# Patient Record
Sex: MALE | Race: WHITE | NOT HISPANIC OR LATINO | Employment: UNEMPLOYED | URBAN - METROPOLITAN AREA
[De-identification: names, ages, dates, MRNs, and addresses within clinical notes are randomized per-mention and may not be internally consistent; named-entity substitution may affect disease eponyms.]

---

## 2022-04-01 ENCOUNTER — MEDICAL CORRESPONDENCE (OUTPATIENT)
Dept: HEALTH INFORMATION MANAGEMENT | Facility: CLINIC | Age: 62
End: 2022-04-01
Payer: MEDICAID

## 2022-04-08 ENCOUNTER — TRANSCRIBE ORDERS (OUTPATIENT)
Dept: OTHER | Age: 62
End: 2022-04-08

## 2022-04-08 DIAGNOSIS — C44.329 SQUAMOUS CELL CANCER OF SKIN OF RIGHT CHEEK: Primary | ICD-10-CM

## 2022-04-11 NOTE — TELEPHONE ENCOUNTER
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

## 2022-04-25 ENCOUNTER — OFFICE VISIT (OUTPATIENT)
Dept: DERMATOLOGY | Facility: CLINIC | Age: 62
End: 2022-04-25
Payer: MEDICAID

## 2022-04-25 DIAGNOSIS — C44.320 SCC (SQUAMOUS CELL CARCINOMA), FACE: Primary | ICD-10-CM

## 2022-04-25 PROCEDURE — 99203 OFFICE O/P NEW LOW 30 MIN: CPT | Mod: GC | Performed by: DERMATOLOGY

## 2022-04-25 ASSESSMENT — PAIN SCALES - GENERAL: PAINLEVEL: SEVERE PAIN (7)

## 2022-04-25 NOTE — LETTER
Date:April 27, 2022      Provider requested that no letter be sent. Do not send.       [facility]

## 2022-04-25 NOTE — PROGRESS NOTES
Mohs Micrographic Surgery Consult Note    Apr 25, 2022    Dermatology Problem List:  1. Melanoma in situ, left back, s/p bx 4/1/22, s/p excision  2. NMSC  - SCC, right sideburn, s/p bx 4/1/22, pending MMS  - SCC, right lateral cheek, s/p bx 4/1/22, pending MMS  - BCC, right upper chest, s/p bx 4/1/22  - BCC, left upper chest, s/p bx 4/1/22  3. HAK, left medial chest, s/p bx 4/1/22    Subjective: The patient is a 61 year old man who presents today for Mohs micrographic surgery consultation for a recent diagnosis of skin cancer.    Skin cancer(s): SCC  Location(s): right sideburn and right lateral cheek  Associated symptoms: pruritus, tenderness to touch  Onset: within last 1 year    No other associated symptoms, modifying factors, or prior treatments, except when noted above. The patient denies any constitutional symptoms, lymphadenopathy, unintentional weight loss or decreased appetite. No other skin concerns today.    Objective:   Gen: This is a well appearing individual in no acute distress. The patient is alert and oriented x 3.  An exam of the right cheek was performed today and visualized the following:  - At the right lateral superior cheek, roughly 1.5 cm pink plaque with crust.  - At the right lateral inferior cheek, roughly 2 cm nodular plaque.    Assessment and Plan:     1. Plan for Mohs micrographic surgery for skin cancer(s) above:  - We discussed the nature of the diagnosis/condition above. We discussed the treatment options, including the risks benefits and expectations of these options. We recommend micrographic surgery as the most effective and most tissue sparing option for treatment, and the patient agrees to proceed with this.  The patient is aware of the risks, benefits and expectations of this procedure. The patient will be scheduled for this procedure, if not already done so.  - We anticipate the following closure type: Linear closure, such as complex linear closure (CLC)   - Consent form was  signed today    The patient was discussed with and evaluated by attending physician, Jean Carlos Peralta MD.    Edgardo Leong MD  Micrographic Surgery and Dermatologic Oncology (MSDO) Fellow    Scribe Disclosure:  I, Mikhail Jacques, am serving as a scribe to document services personally performed by Jean Carlos Peralta MD based on data collection and the provider's statements to me.     Attending Attestation  I attest that I discussed the case with the Fellow.  I agree with the plan.   I have reviewed the note and edited it as necessary.    Jean Carlos Peralta M.D.  Professor  Director of Dermatologic Surgery  Department of Dermatology  [facility]

## 2022-04-25 NOTE — LETTER
4/25/2022       RE: Cornelius Ruiz  [id number]  [place] [address]     Dear Colleague,    Thank you for referring your patient, Cornelius Ruiz, to the [facility] [place] at [facility]. Please see a copy of my visit note below.    Mohs Micrographic Surgery Consult Note    Apr 25, 2022    Dermatology Problem List:  1. Melanoma in situ, left back, s/p bx 4/1/22, s/p excision  2. NMSC  - SCC, right sideburn, s/p bx 4/1/22, pending MMS  - SCC, right lateral cheek, s/p bx 4/1/22, pending MMS  - BCC, right upper chest, s/p bx 4/1/22  - BCC, left upper chest, s/p bx 4/1/22  3. HAK, left medial chest, s/p bx 4/1/22    Subjective: The patient is a 61 year old man who presents today for Mohs micrographic surgery consultation for a recent diagnosis of skin cancer.    Skin cancer(s): SCC  Location(s): right sideburn and right lateral cheek  Associated symptoms: pruritus, tenderness to touch  Onset: within last 1 year    No other associated symptoms, modifying factors, or prior treatments, except when noted above. The patient denies any constitutional symptoms, lymphadenopathy, unintentional weight loss or decreased appetite. No other skin concerns today.    Objective:   Gen: This is a well appearing individual in no acute distress. The patient is alert and oriented x 3.  An exam of the right cheek was performed today and visualized the following:  - At the right lateral superior cheek, roughly 1.5 cm pink plaque with crust.  - At the right lateral inferior cheek, roughly 2 cm nodular plaque.    Assessment and Plan:     1. Plan for Mohs micrographic surgery for skin cancer(s) above:  - We discussed the nature of the diagnosis/condition above. We discussed the treatment options, including the risks benefits and expectations of these options. We recommend micrographic surgery as the most effective and most tissue sparing option for  treatment, and the patient agrees to proceed with this.  The patient is aware of the risks, benefits and expectations of this procedure. The patient will be scheduled for this procedure, if not already done so.  - We anticipate the following closure type: Linear closure, such as complex linear closure (CLC)   - Consent form was signed today    The patient was discussed with and evaluated by attending physician, Jean Carlos Peralta MD.    Edgardo Leong MD  Micrographic Surgery and Dermatologic Oncology (MSDO) Fellow    Scribe Disclosure:  I, Mikhail Jacques, am serving as a scribe to document services personally performed by Jean Carlos Peralta MD based on data collection and the provider's statements to me.     Attending Attestation  I attest that I discussed the case with the Fellow.  I agree with the plan.   I have reviewed the note and edited it as necessary.    Jean Carlos Peralta M.D.  Professor  Director of Dermatologic Surgery  Department of Dermatology  [facility]        Again, thank you for allowing me to participate in the care of your patient.      Sincerely,    Jean Carlos Peralta MD

## 2022-04-25 NOTE — PATIENT INSTRUCTIONS
Mohs Micrographic Surgery     Mohs Surgery Quick Tips     Please reserve the half day that you are with us and do not schedule any other appointments the morning of your surgery date. We cannot guarantee what time the surgery will be done as it will depend on how many times the Mohs surgeon has to go back and remove more tissue to completely remove your skin cancer.  If you suspect you will experience excess anxiety or claustrophobia during the procedure, please let the Mohs surgeon know prior to surgery to allow us time to address this. If an anti-anxiety medication is required, then you will need a designated  to drive you home.  Mohs surgery is done under local anesthesia, so you should eat breakfast and take your regularly scheduled medications. You do NOT need to fast.  Wear comfortable, loose-fitting clothing that can easily be taken off and put back on before and after surgery.  Most of your time with us will be spent waiting for tissue to be processed and carefully looked at under the microscope by the Mohs surgeon. Bring with you a book, tablet, or smartphone that you can use to spend the waiting time. You are allowed to eat lunch.  You should have a designated  if surgery will involve an area that can occlude vision. This is because you can get swelling/bruising immediately after surgery and will go home with a bulky bandage that could obstruct your view of driving safely.  In most cases, you will go home with a bulky pressure dressing over the site that will need to remain on, clean, and dry for the first 48 hours. You will not be able to get the site wet for the first 48 hours. This bulky pressure dressing is to help prevent post-op bleeding. Your nurse will provide detailed wound care instructions verbally and in writing on the day of surgery.  The overwhelming majority of patients manage their normal post-op pain with Tylenol alternating with ibuprofen.   Any time the skin is cut  surgically, including with Mohs surgery, a scar forms. Scars are unavoidable but are minimized with the Mohs surgery approach. A scar is thought to be better than a skin cancer that could become a serious risk to your future health. The goal with Mohs surgery is for the scar to be barely noticeable by an acquaintance talking with you at a normal conversational distance (say, 4-5 feet away) by 3 months after your surgery. In the meantime, please be patient as it takes about 3 months for scars to mature in appearance and begin to fade.  Do NOT wear make-up on the day of surgery if the skin cancer is on your face.  Do NOT use Neosporin on your wound. It is not effective at preventing infections and can lead to irritation similar to an allergic reaction at the wound site. Do not use harsh soaps like Dial soap or [place] on your wound as this will also lead to irritation.     What is  Mohs micrographic surgery ?     Mohs micrographic surgery is considered the most effective treatment for many skin cancers. It is named after the late Dr. Frederic Mohs, who pioneered this technique. Note that  Mohs  surgery is not an abbreviation or acronym, but is named after Dr. Mohs.  Overview of Mohs Micrographic Surgery  Skin cancer often resembles the  tip of the iceberg  with more tumor cells growing downward and outward into the skin like the roots of a tree. These  roots  are not visible with the naked eye, but instead can be seen under a microscope. With the Mohs technique, the dermatologic surgeon can precisely identify and remove an entire tumor while leaving the surrounding healthy tissue intact and unharmed. Mohs surgery has the highest success rate of all treatments for many skin cancers - up to 99%.  Mohs Surgeons  Fellowship-trained Mohs surgeons are specially trained as a cancer surgeon, pathologist, and reconstructive surgeon all in one.  Advantages of Mohs Surgery  Mohs surgery is unique and so effective because of  the way the removed tissue is microscopically examined, evaluating 100% of the surgical margins. The pathologic interpretation of the tissue margins is done on site by the Mohs surgeon, who is specially trained in the reading of these slides.     Advantages of Mohs surgery include:  - Ensuring complete cancer removal during surgery to provide the best cure rate and protect against cancer recurrence  - Minimizing the amount of healthy tissue lost  - Maximizing the functional and cosmetic outcome resulting from surgery  - Repairing the site of the cancer the same day the cancer is removed (in most cases)  - Curing skin cancer when other methods have failed  Other skin cancer treatment methods require the provider to often blindly estimate the amount of tissue to treat and remove, which can result in the unnecessary removal of healthy skin tissue, as well as the skin cancer coming back if any part of it is left behind.     Your Mohs Procedure  Mohs skin cancer surgery is an outpatient procedure, which takes place in our on-site surgical suite. Our suite is equipped with a dedicated Mohs laboratory for microscopic examination of tissue. Surgeries start early in the morning and are completed the same day, depending on the extent of the tumor and the amount of reconstruction necessary.  First, you will receive local anesthesia (i.e., injections with lidocaine anesthetic) around the area of the tumor, so you are awake during the entire procedure. The use of local anesthesia versus general anesthesia provides many benefits, including preventing a lengthy recovery, possible side effects from general anesthesia, and cost. You are completely numb in the area of the surgery, however, so the procedure is comfortable.      After the area has been numbed, all visible tumor, along with a thin layer of surrounding tissue, is taken out with a scalpel blade. After this, patients are bandaged by our nursing staff and can rest in the  reception area, cafe on the first floor, or can remain in the patient room while awaiting testing results. A specially trained technician prepares the tissue and puts it on slides for your Mohs surgeon to examine under a microscope. Waiting time can range from 60-90 minutes while the tissue is being processed. If cancer involving the edges of the removed tissue is seen by the Mohs surgeon under the microscope, then another layer of tissue is removed from the area where the cancer was detected using a scalpel blade. This way only cancerous tissue is targeted during the procedure, minimizing the loss of healthy tissue. These steps are repeated until no cancer is remaining. Most skin cancers require 1 to 3 rounds for complete removal (all performed on the same day). Aggressive cancers can take several rounds of surgery to cure. Sometimes skin cancer can appear to be small to the naked eye but in fact is larger in reality. This means that the wound left behind after skin cancer removal could be larger than what you may expect, but remember that this may be necessary to remove all the skin cancer present. You do not want any skin cancer to be left behind as that will lead to the skin cancer coming back within months to years and may require a larger surgery.  After Your Skin Cancer is Removed  When your surgery is complete, your Mohs surgeon, who has expertise in reconstructive surgery, assesses the wound and discusses your options for the best functional and cosmetic outcome.      Closing the wound:  Depending on the size of the wound and what your Mohs surgeon decides, your wound will be closed using one of the following options:  Letting the wound heal on its own from the edges and without stitches  Using stitches to close the wound in a line  Using stitches to close the wound by shifting skin from a nearby area of skin (called a  skin flap )  Using stitches to place a skin graft from another part of the body on the  wound     Every wound is specially managed to maximize the functional and cosmetic outcome. For reconstruction on the head, we take great care to make sure stitches, if needed, do not interfere with the resting positions of the eyelids, nose, mouth, and ears, so we sometimes have to use facial plastic surgery techniques to close wounds. Patients are sometimes surprised by how many stitches are used, but this is because we want to do a good job at carefully lining up the wound edges to minimize scarring and prevent the wound from opening up later on, which could lead to an infection. We try to use stitches that self-dissolve whenever possible. In most cases, you will go home with a bulky pressure dressing over the surgical site that will need to remain on, clean, and dry for the first 48 hours. You will not be able to get the site wet for the first 48 hours. This bulky pressure dressing is to help prevent post-op bleeding. Your nurse will give you detailed wound care instructions verbally and in writing on the day of your surgery.     Post-op pain  Tylenol and ibuprofen are sufficient for pain control for the overwhelming majority of patients. If you have been told by another physician not to take Tylenol (also called acetaminophen) or ibuprofen (also called Motrin or Advil), then let your Mohs surgeon know. We typically suggest taking Tylenol 1,000 mg (i.e., two extra strength tabs) every 8 hours. In between those doses of Tylenol, you can take ibuprofen 400 mg (two tabs) every eight hours. This essentially means that you would take either Tylenol or ibuprofen alternating every four hours. Do NOT take more than 4,000 mg of Tylenol or 3,200 mg of ibuprofen per 24 hour period. Icing for 15 minutes every hour will help with pain and swelling as well, and it is especially helpful for surgeries around the eyes. Keeping the wound area elevated will also help with swelling and pain. If your wound is on your lower leg, then  wearing compression stockings can reduce swelling and speed healing.     Expectations About Surgical Scars  Any time the skin is cut, the body forms a scar. This is normal and natural, and a scar is thought to be better than a skin cancer that could become a serious risk to your health. The goal with Mohs surgery is for the scar to be barely noticeable by an acquaintance talking with you at a normal conversational distance (say, 4-5 feet away) by 3 months after your surgery. In the meantime, please be patient as it takes about 3 months for scars to mature in appearance and begin to fade. It is important to wear sunscreen over a scar starting about 1 month after surgery as scars do not tan normally and can become discolored, compared with the surrounding skin, after sun exposure. Additionally, gentle scar massage can often be started about 1 month after surgery. This means gentle, kneading massage on the scar for a couple minutes several times a day. After three months of waiting to ensure all inflammation has resolved and the scar has matured, we are happy to see you if you have persistent concerns with scarring. On rare occasions, we perform steroid injections or use a laser to treat a scar. These types of treatments are not  one-and-done  and multiple sessions are usually necessary to help scar appearance.     Wound care instructions:    Leave the initial pressure dressing on for 48 hours. A pressure dressing is placed to provide consistent pressure that will decrease the chance of bleeding.    After the first 48 hours you should remove the dressing and follow the instructions below:   1) Clean the incisions/surrounding area with a gentle cleanser and warm water. This can be done in or out of the shower, but it is important to note that if you do it in the shower it should be the last thing you clean.    2) Pat the area dry with a clean towel.   3) Apply Vaseline over the sutured area.  Use a new container or the  sterile packets given to you, do not use Vaseline that is in your cupboard as this is likely contaminated. The Vaseline will keep the sutures moist, help dissolvable sutures dissolve, and prevent scabbing from occurring which can cause scarring. Do not apply anything other than Vaseline (no bacitracin, hydrogen peroxide, etc.) for the first 2 weeks as this may cause the sutures to dissolve sooner than appropriate.   4) Cover the area with a non-stick gauze and tape or another bandage of your choice.    5) Make sure that you practice good hand hygiene prior to removing the bandage and use a q tip to apply Vaseline to the area.    6) Repeat the steps above daily for the first 2 weeks after surgery.          Risks:    Scar formation at the site of tumor removal. You may need further treatment with steroid injections/lasers for scarring.    Larger than expected wound created upon removal of the skin cancer.   Poor wound healing, which may be due to the patient's underlying health conditions or failure of the wound repair method.   Excessive bleeding from the wound, which could affect wound healing and/or result in the need for more office visits.    Wound that becomes infected (an uncommon occurrence, minimized by using sterile technique).   Loss of nerve function (muscle or feeling) if a tumor invades a nerve, which can be temporary or permanent.   Regrowth of tumor after removal (more common with previously treated tumors and large, longstanding tumors).   Cosmetic or functional deformities if tumor is near or on an important structure  ture such as eyes, eyelids, nose, ears, lips, forehead, scalp, fingers, or genital area.      If you have any questions, please feel free to contact us.    Phone numbers:    During business hours (M-F 8:00-4:30 p.m.)    [facility]   [phone]

## 2022-04-25 NOTE — NURSING NOTE
Dermatology Rooming Note    Cornelius Ruiz's goals for this visit include:   Chief Complaint   Patient presents with     Squamous Cell Carcinoma     Cornelius is here for a consult regarding squamous cell cancer of skin on right cheek x2. Has had MOHS procedure in the past, but it grew back.     Tonia Castro, Facilitator

## 2022-04-25 NOTE — PROGRESS NOTES
[facility] Mohs Surgery Procedure Note    Case #: 1  Date of Service:  Apr 26, 2022  Surgery: Mohs micrographic surgery (MMS)  Staff surgeon: Jean Carlos Peralta MD  Fellow surgeon: Edgardo Leong MD  Resident surgeon: None  Nurse: Lisa Johnson CMA    Tumor Type: SCC  Location: right lateral cheek  Derm-Path Accession #: [id number]     Mohs Accession #:   Pre-Op Size: 1.9 cm x 2.0 cm  Defect Size: 2.9 x 3.0 cm  Number of Mohs stages: 1  Level of Defect: Fascia  Local anesthetic: 3 mL 1% lidocaine with epinephrine  Repair Type: Burow's advancement with other flap, combined closure MMS site  Repair Size: 7.8 x 5.0 cm  Suture Material: 4-0 Monocryl; 5-0 fast absorbing gut    Procedure:    Stage I  We discussed the principles of treatment and most likely complications including scarring, bleeding, infection, swelling, pain, crusting, nerve damage, large wound,  incomplete excision, wound dehiscence,  nerve damage, recurrence, and a second procedure may be recommended to obtain the best cosmetic or functional result.    Informed consent was obtained and the patient underwent the procedure as follows:  The patient was placed supine on the operating table.  The cancer was identified, outlined with a marker, and verified by the patient.  The entire surgical field was prepped with chlorhexidine.  The surgical site was anesthetized using lidocaine with epinephrine.    The area of clinically apparent tumor was debulked. The layer of tissue was then surgically excised using a #15 blade and was then transferred onto a specimen sheet maintaining the orientation of the specimen. Hemostasis was obtained using electrocoagulation. The wound site was then covered with a dressing while the tissue samples were processed for examination.    The excised tissue was transported to the Mohs histology laboratory maintaining the tissue orientation.  The tissue specimen was relaxed so that the entire surgical margin was in a a  single horizontal plane for sectioning and inked for precise mapping.  A precise reference map was drawn to reflect the sectioning of the specimen, colored inking of the margins, and orientation on the patient.  The tissue was processed using horizontal sectioning of the base and continuous peripheral margins.  The histopathologic sections were reviewed in conjunction with the reference map.    Total blocks: 2  Total slides: 6    There were no cancer cells visualized on examination, therefore Mohs surgery was complete.    Reconstruction: Advancement Flap    PROCEDURE:  The wound was debeveled and undermined broadly in all directions to the level of fat. Hemostasis was obtained using electrocoagulation. The advancement flap was incised to the level of fat removing a cone of redundant tissue from right lateral cheek. The flap was further undermined in all directions.    Hemostasis was again obtained as above.  The flap was then advanced into the defect and secured using buried dermal sutures.  Redundant areas of tissue were excised using the triangulation technique.  The flap wound edges of both the primary and secondary defects were then approximated with buried dermal sutures, and the epidermis was then carefully approximated using 5-0 fast absorbing gut sutures.  The wound was cleansed with sterile saline, and ointment was applied along the wound surface.  A sterile pressure dressing of non-adherent gauze was applied and wound care instructions were given verbally and in writing. The patient left the operating suite in stable condition. Derma-Abrasion can be used as a second stage of this reconstruction to enhance cosmesis.    Jean Carlos Peralta MD was always immediately available.    Dr. Edgardo Leong (Mohs micrographic surgery fellow) performed the Mohs micrographic surgery and reconstruction under the direct supervision of Jean Carlos Peralta MD, who was present for the entire micrographic surgery and key portions of the  reconstruction, and always immediately available.    [facility] Mohs Surgery Procedure Note    Case #: 2  Date of Service:  Apr 26, 2022  Surgery: Mohs micrographic surgery (MMS)  Staff surgeon: Jean Carlos Peralta MD  Fellow surgeon: Edgardo Leong MD  Resident surgeon: None  Nurse: Lisa Johnson CMA    Tumor Type: SCC  Location: right sideburn  Derm-Path Accession #: [id number]     Mohs Accession #:   Pre-Op Size: 1.0 cm x 1.4 cm  Defect Size: 1.9 x 2.2 cm  Number of Mohs stages: 1  Level of Defect: Fat  Local anesthetic: 3 mL 1% lidocaine with epinephrine  Repair Type: Burow's advancement flap, combined closure site  Repair Size: 7.8 x 5.0 cm  Suture Material: 4-0 Monocryl; 5-0 fast absorbing gut    Procedure:    Stage I  We discussed the principles of treatment and most likely complications including scarring, bleeding, infection, swelling, pain, crusting, nerve damage, large wound,  incomplete excision, wound dehiscence,  nerve damage, recurrence, and a second procedure may be recommended to obtain the best cosmetic or functional result.    Informed consent was obtained and the patient underwent the procedure as follows:  The patient was placed supine on the operating table.  The cancer was identified, outlined with a marker, and verified by the patient.  The entire surgical field was prepped with chlorhexidine.  The surgical site was anesthetized using lidocaine with epinephrine.    The area of clinically apparent tumor was debulked. The layer of tissue was then surgically excised using a #15 blade and was then transferred onto a specimen sheet maintaining the orientation of the specimen. Hemostasis was obtained using electrocoagulation. The wound site was then covered with a dressing while the tissue samples were processed for examination.    The excised tissue was transported to the Mohs histology laboratory maintaining the tissue orientation.  The tissue specimen was relaxed so that the entire  surgical margin was in a a single horizontal plane for sectioning and inked for precise mapping.  A precise reference map was drawn to reflect the sectioning of the specimen, colored inking of the margins, and orientation on the patient.  The tissue was processed using horizontal sectioning of the base and continuous peripheral margins.  The histopathologic sections were reviewed in conjunction with the reference map.    Total blocks: 1  Total slides: 2    There were no cancer cells visualized on examination, therefore Mohs surgery was complete.    Reconstruction: Advancement Flap    PROCEDURE:  The wound was debeveled and undermined broadly in all directions to the level of fat. Hemostasis was obtained using electrocoagulation. The advancement flap was incised to the level of fat removing a cone of redundant tissue from the right sideburn. The flap was further undermined in all directions.    Hemostasis was again obtained as above.  The flap was then advanced into the defect and secured using buried dermal sutures.  Redundant areas of tissue were excised using the triangulation technique.  The flap wound edges of both the primary and secondary defects were then approximated with buried dermal sutures, and the epidermis was then carefully approximated using 5-0 fast absorbing gut sutures.  The wound was cleansed with sterile saline, and ointment was applied along the wound surface.  A sterile pressure dressing of non-adherent gauze was applied and wound care instructions were given verbally and in writing. The patient left the operating suite in stable condition. Derma-Abrasion can be used as a second stage of this reconstruction to enhance cosmesis.    Jean Carlos Peralta MD was always immediately available.    Dr. Edgardo Leong (Mohs micrographic surgery fellow) performed the Mohs micrographic surgery and reconstruction under the direct supervision of Jean Carlos Peralta MD, who was present for the entire micrographic surgery and key  portions of the reconstruction, and always immediately available.    Dr. Edgardo Leong (Mohs micrographic surgery fellow) performed the micrographic surgery; and Jean Carlos Peralta MD performed the reconstruction/repair and was present for the entire micrographic surgery and always immediately available.    Scribe Disclosure:  I, Zulma Nayak, am serving as a scribe to document services personally performed by Jean Carlos Peralta MD based on data collection and the provider's statements to me.

## 2022-04-26 ENCOUNTER — OFFICE VISIT (OUTPATIENT)
Dept: DERMATOLOGY | Facility: CLINIC | Age: 62
End: 2022-04-26
Payer: MEDICAID

## 2022-04-26 ENCOUNTER — PRE VISIT (OUTPATIENT)
Dept: DERMATOLOGY | Facility: CLINIC | Age: 62
End: 2022-04-26

## 2022-04-26 VITALS — DIASTOLIC BLOOD PRESSURE: 97 MMHG | SYSTOLIC BLOOD PRESSURE: 142 MMHG

## 2022-04-26 DIAGNOSIS — C44.329 SQUAMOUS CELL CANCER OF SKIN OF RIGHT CHEEK: ICD-10-CM

## 2022-04-26 DIAGNOSIS — T81.49XA SURGICAL SITE INFECTION: Primary | ICD-10-CM

## 2022-04-26 PROCEDURE — 17311 MOHS 1 STAGE H/N/HF/G: CPT | Mod: 51 | Performed by: DERMATOLOGY

## 2022-04-26 PROCEDURE — 87077 CULTURE AEROBIC IDENTIFY: CPT | Performed by: DERMATOLOGY

## 2022-04-26 PROCEDURE — 14301 TIS TRNFR ANY 30.1-60 SQ CM: CPT | Performed by: DERMATOLOGY

## 2022-04-26 RX ORDER — CEPHALEXIN 500 MG/1
500 CAPSULE ORAL 3 TIMES DAILY
Qty: 30 CAPSULE | Refills: 0 | Status: SHIPPED | OUTPATIENT
Start: 2022-04-26

## 2022-04-26 ASSESSMENT — PAIN SCALES - GENERAL: PAINLEVEL: NO PAIN (0)

## 2022-04-26 NOTE — NURSING NOTE
Chief Complaint   Patient presents with     Derm Problem     Patient Is here today for mohs.      Lisa PORTER CMA

## 2022-04-26 NOTE — LETTER
4/26/2022       RE: Cornelius Ruiz  [id number]  [place] [address]     Dear Colleague,    Thank you for referring your patient, Cornelius Ruiz, to the [facility] DERMATOLOGIC SURGERY CLINIC [place] at [facility]. Please see a copy of my visit note below.    [facility] Mohs Surgery Procedure Note    Case #: 1  Date of Service:  Apr 26, 2022  Surgery: Mohs micrographic surgery (MMS)  Staff surgeon: Jean Carlos Peralta MD  Fellow surgeon: Edgardo Leong MD  Resident surgeon: None  Nurse: Lisa Johnson CMA    Tumor Type: SCC  Location: right lateral cheek  Derm-Path Accession #: [id number]     Mohs Accession #:   Pre-Op Size: 1.9 cm x 2.0 cm  Defect Size: 2.9 x 3.0 cm  Number of Mohs stages: 1  Level of Defect: Fascia  Local anesthetic: 3 mL 1% lidocaine with epinephrine  Repair Type: Burow's advancement with other flap, combined closure MMS site  Repair Size: 7.8 x 5.0 cm  Suture Material: 4-0 Monocryl; 5-0 fast absorbing gut    Procedure:    Stage I  We discussed the principles of treatment and most likely complications including scarring, bleeding, infection, swelling, pain, crusting, nerve damage, large wound,  incomplete excision, wound dehiscence,  nerve damage, recurrence, and a second procedure may be recommended to obtain the best cosmetic or functional result.    Informed consent was obtained and the patient underwent the procedure as follows:  The patient was placed supine on the operating table.  The cancer was identified, outlined with a marker, and verified by the patient.  The entire surgical field was prepped with chlorhexidine.  The surgical site was anesthetized using lidocaine with epinephrine.    The area of clinically apparent tumor was debulked. The layer of tissue was then surgically excised using a #15 blade and was then transferred onto a specimen sheet maintaining the orientation of the specimen. Hemostasis  was obtained using electrocoagulation. The wound site was then covered with a dressing while the tissue samples were processed for examination.    The excised tissue was transported to the Mohs histology laboratory maintaining the tissue orientation.  The tissue specimen was relaxed so that the entire surgical margin was in a a single horizontal plane for sectioning and inked for precise mapping.  A precise reference map was drawn to reflect the sectioning of the specimen, colored inking of the margins, and orientation on the patient.  The tissue was processed using horizontal sectioning of the base and continuous peripheral margins.  The histopathologic sections were reviewed in conjunction with the reference map.    Total blocks: 2  Total slides: 6    There were no cancer cells visualized on examination, therefore Mohs surgery was complete.    Reconstruction: Advancement Flap    PROCEDURE:  The wound was debeveled and undermined broadly in all directions to the level of fat. Hemostasis was obtained using electrocoagulation. The advancement flap was incised to the level of fat removing a cone of redundant tissue from right lateral cheek. The flap was further undermined in all directions.    Hemostasis was again obtained as above.  The flap was then advanced into the defect and secured using buried dermal sutures.  Redundant areas of tissue were excised using the triangulation technique.  The flap wound edges of both the primary and secondary defects were then approximated with buried dermal sutures, and the epidermis was then carefully approximated using 5-0 fast absorbing gut sutures.  The wound was cleansed with sterile saline, and ointment was applied along the wound surface.  A sterile pressure dressing of non-adherent gauze was applied and wound care instructions were given verbally and in writing. The patient left the operating suite in stable condition. Derma-Abrasion can be used as a second stage of this  reconstruction to enhance cosmesis.    Jean Carlos Peralta MD was always immediately available.    Dr. Edgardo Leong (Mohs micrographic surgery fellow) performed the Mohs micrographic surgery and reconstruction under the direct supervision of Jean Carlos Peralta MD, who was present for the entire micrographic surgery and key portions of the reconstruction, and always immediately available.    [facility] Mohs Surgery Procedure Note    Case #: 2  Date of Service:  Apr 26, 2022  Surgery: Mohs micrographic surgery (MMS)  Staff surgeon: Jean Carlos Peralta MD  Fellow surgeon: Edgardo Leong MD  Resident surgeon: None  Nurse: Lisa Johnson CMA    Tumor Type: SCC  Location: right sideburn  Derm-Path Accession #: [id number]     Mohs Accession #:   Pre-Op Size: 1.0 cm x 1.4 cm  Defect Size: 1.9 x 2.2 cm  Number of Mohs stages: 1  Level of Defect: Fat  Local anesthetic: 3 mL 1% lidocaine with epinephrine  Repair Type: Burow's advancement flap, combined closure site  Repair Size: 7.8 x 5.0 cm  Suture Material: 4-0 Monocryl; 5-0 fast absorbing gut    Procedure:    Stage I  We discussed the principles of treatment and most likely complications including scarring, bleeding, infection, swelling, pain, crusting, nerve damage, large wound,  incomplete excision, wound dehiscence,  nerve damage, recurrence, and a second procedure may be recommended to obtain the best cosmetic or functional result.    Informed consent was obtained and the patient underwent the procedure as follows:  The patient was placed supine on the operating table.  The cancer was identified, outlined with a marker, and verified by the patient.  The entire surgical field was prepped with chlorhexidine.  The surgical site was anesthetized using lidocaine with epinephrine.    The area of clinically apparent tumor was debulked. The layer of tissue was then surgically excised using a #15 blade and was then transferred onto a specimen sheet maintaining the orientation of the  specimen. Hemostasis was obtained using electrocoagulation. The wound site was then covered with a dressing while the tissue samples were processed for examination.    The excised tissue was transported to the [facility] histology laboratory maintaining the tissue orientation.  The tissue specimen was relaxed so that the entire surgical margin was in a a single horizontal plane for sectioning and inked for precise mapping.  A precise reference map was drawn to reflect the sectioning of the specimen, colored inking of the margins, and orientation on the patient.  The tissue was processed using horizontal sectioning of the base and continuous peripheral margins.  The histopathologic sections were reviewed in conjunction with the reference map.    Total blocks: 1  Total slides: 2    There were no cancer cells visualized on examination, therefore Mohs surgery was complete.    Reconstruction: Advancement Flap    PROCEDURE:  The wound was debeveled and undermined broadly in all directions to the level of fat. Hemostasis was obtained using electrocoagulation. The advancement flap was incised to the level of fat removing a cone of redundant tissue from the right sideburn. The flap was further undermined in all directions.    Hemostasis was again obtained as above.  The flap was then advanced into the defect and secured using buried dermal sutures.  Redundant areas of tissue were excised using the triangulation technique.  The flap wound edges of both the primary and secondary defects were then approximated with buried dermal sutures, and the epidermis was then carefully approximated using 5-0 fast absorbing gut sutures.  The wound was cleansed with sterile saline, and ointment was applied along the wound surface.  A sterile pressure dressing of non-adherent gauze was applied and wound care instructions were given verbally and in writing. The patient left the operating suite in stable condition. Derma-Abrasion can be used as a  second stage of this reconstruction to enhance cosmesis.    Jean Carlos Peralta MD was always immediately available.    Dr. Edgardo Leogn (Mohs micrographic surgery fellow) performed the Mohs micrographic surgery and reconstruction under the direct supervision of Jean Carlos Peralta MD, who was present for the entire micrographic surgery and key portions of the reconstruction, and always immediately available.    Dr. Edgardo Leong (Mohs micrographic surgery fellow) performed the micrographic surgery; and Jean Carlos Peralta MD performed the reconstruction/repair and was present for the entire micrographic surgery and always immediately available.    Scribe Disclosure:  Zulma BLANCO, am serving as a scribe to document services personally performed by Jean Carlos Peralta MD based on data collection and the provider's statements to me.     Attestation signed by Jean Carlos Peralta MD at 4/27/2022  2:58 PM:    Attending attestation:  I was present for key elements of the procedure and immediately available for all other portions of the procedure.  I have reviewed the note and edited it as necessary.    Jean Carlos Peralta M.D.  Professor  Director of Dermatologic Surgery  Department of Dermatology  [facility]    Dermatology Surgery Clinic  [facility]  [address] [place], [place] [id number]

## 2022-04-28 LAB
BACTERIA SPEC CULT: ABNORMAL
BACTERIA SPEC CULT: ABNORMAL

## 2022-05-03 ENCOUNTER — MEDICAL CORRESPONDENCE (OUTPATIENT)
Dept: HEALTH INFORMATION MANAGEMENT | Facility: CLINIC | Age: 62
End: 2022-05-03
Payer: MEDICAID

## 2022-05-03 ENCOUNTER — TRANSFERRED RECORDS (OUTPATIENT)
Dept: HEALTH INFORMATION MANAGEMENT | Facility: CLINIC | Age: 62
End: 2022-05-03
Payer: MEDICAID

## 2022-05-13 ENCOUNTER — HOSPITAL ENCOUNTER (OUTPATIENT)
Dept: CARDIOLOGY | Facility: CLINIC | Age: 62
Discharge: HOME | End: 2022-05-13
Attending: FAMILY MEDICINE | Admitting: FAMILY MEDICINE
Payer: MEDICAID

## 2022-05-13 DIAGNOSIS — R06.09 DOE (DYSPNEA ON EXERTION): ICD-10-CM

## 2022-05-13 PROCEDURE — 255N000002 HC RX 255 OP 636: Performed by: INTERNAL MEDICINE

## 2022-05-13 PROCEDURE — C8928 TTE W OR W/O FOL W/CON,STRES: HCPCS

## 2022-05-13 PROCEDURE — 93325 DOPPLER ECHO COLOR FLOW MAPG: CPT | Mod: 26 | Performed by: INTERNAL MEDICINE

## 2022-05-13 PROCEDURE — 93018 CV STRESS TEST I&R ONLY: CPT | Performed by: INTERNAL MEDICINE

## 2022-05-13 PROCEDURE — 93321 DOPPLER ECHO F-UP/LMTD STD: CPT | Mod: 26 | Performed by: INTERNAL MEDICINE

## 2022-05-13 PROCEDURE — 93321 DOPPLER ECHO F-UP/LMTD STD: CPT | Mod: TC

## 2022-05-13 PROCEDURE — 93350 STRESS TTE ONLY: CPT | Mod: 26 | Performed by: INTERNAL MEDICINE

## 2022-05-13 PROCEDURE — 93016 CV STRESS TEST SUPVJ ONLY: CPT | Performed by: INTERNAL MEDICINE

## 2022-05-13 RX ORDER — ASPIRIN 81 MG/1
81 TABLET ORAL DAILY
COMMUNITY

## 2022-05-13 RX ORDER — CIPROFLOXACIN 500 MG/5ML
KIT ORAL 2 TIMES DAILY
COMMUNITY

## 2022-05-13 RX ORDER — SILDENAFIL 50 MG/1
50 TABLET, FILM COATED ORAL DAILY PRN
COMMUNITY

## 2022-05-13 RX ORDER — NIACINAMIDE 500 MG
500 TABLET ORAL 2 TIMES DAILY WITH MEALS
COMMUNITY
End: 2022-11-18

## 2022-05-13 RX ADMIN — PERFLUTREN 5 ML: 6.52 INJECTION, SUSPENSION INTRAVENOUS at 09:35

## 2022-05-19 ENCOUNTER — TRANSFERRED RECORDS (OUTPATIENT)
Dept: HEALTH INFORMATION MANAGEMENT | Facility: CLINIC | Age: 62
End: 2022-05-19
Payer: MEDICAID

## 2022-05-23 ENCOUNTER — TELEPHONE (OUTPATIENT)
Dept: DERMATOLOGY | Facility: CLINIC | Age: 62
End: 2022-05-23
Payer: MEDICAID

## 2022-05-23 NOTE — TELEPHONE ENCOUNTER
had 2 SCCs removed from his chest last Wednesday and he said that they hurt more now than the day he got them and he is worried about infection. He does not wish to go back to [place] and wants to potentially see Dr. Peralta sometime this  Week.    Attached photos    Elly Soto, Procedure

## 2022-05-25 ENCOUNTER — OFFICE VISIT (OUTPATIENT)
Dept: DERMATOLOGY | Facility: CLINIC | Age: 62
End: 2022-05-25
Payer: MEDICAID

## 2022-05-25 DIAGNOSIS — L03.313 CELLULITIS OF CHEST WALL: Primary | ICD-10-CM

## 2022-05-25 DIAGNOSIS — Z85.828 HISTORY OF SKIN CANCER: ICD-10-CM

## 2022-05-25 PROCEDURE — 87077 CULTURE AEROBIC IDENTIFY: CPT | Performed by: DERMATOLOGY

## 2022-05-25 PROCEDURE — 87205 SMEAR GRAM STAIN: CPT | Performed by: DERMATOLOGY

## 2022-05-25 PROCEDURE — 99213 OFFICE O/P EST LOW 20 MIN: CPT | Mod: GC | Performed by: DERMATOLOGY

## 2022-05-25 RX ORDER — DOXYCYCLINE 100 MG/1
100 CAPSULE ORAL 2 TIMES DAILY
Qty: 20 CAPSULE | Refills: 0 | Status: SHIPPED | OUTPATIENT
Start: 2022-05-25 | End: 2022-06-04

## 2022-05-25 ASSESSMENT — PAIN SCALES - GENERAL: PAINLEVEL: EXTREME PAIN (8)

## 2022-05-25 NOTE — LETTER
5/25/2022       RE: Cornelius Ruiz  [id number]  [place] [address]     Dear Colleague,    Thank you for referring your patient, Cornelius Ruiz, to the [facility] DERMATOLOGIC SURGERY CLINIC [place] at [facility]. Please see a copy of my visit note below.    Dermatologic Surgery Clinic Note    May 25, 2022    Dermatology Problem List:  1. Melanoma in situ, left back, s/p bx 4/1/22, s/p excision  2. NMSC  - SCC, right sideburn, s/p bx 4/1/22, s/p MMS 4/26/22  - SCC, right lateral cheek, s/p bx 4/1/22, s/p MMS 4/26/22  - BCC, right upper chest, s/p bx 4/1/22, s/p excision 05/2023  - BCC, left upper chest, s/p bx 4/1/22, s/p excision 05/2023  3. 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 Cellulitis, chest  - Doxycycline 100 mg BID x10 days initiated 5/25/2022    Subjective: The patient is a 61 year old man who presents today for a wound check after recent dermatologic surgery on his right cheek. No concerns for infection today on the right cheek. The patient continues with daily wound cares as recommended.    He also would like excision spots on his chest evaluated as he thinks these may be infected.     No other associated symptoms, modifying factors, or prior treatments, except when noted above. The patient denies any constitutional symptoms, lymphadenopathy, unintentional weight loss or decreased appetite. No other skin concerns today.      Objective:   Gen: This is a well appearing individual in no acute distress. The patient is alert and oriented x 3.  An exam of the face and chest was performed today and visualized the following:  - At the chest, there are 2 incision sites with surrounding erythema, mild purulent drainage, and tenderness to palpation.    Assessment and Plan:     # Cellulitis, chest  S/p excision on the R and L upper check at BCC sites. Given signs of infection on examination today, will obtain a bacteria swab and prescribe  antibiotics as indicated below.   - Start doxycycline 100 mg BID for 10 days. Patient counseled on potential side effects.   - Bacteria swab obtained today  - The patient was told to continue with wound cares until the area(s) is/are no longer crusted.   - The patient should follow up with dermatologic surgery PRN, as well as continue with regular skin exams in general dermatology clinic. Referral for general dermatology placed for skin check within 6 months.    The patient was discussed with and evaluated by attending physician, Jean Carlos Peralta MD.    Edgardo Leong MD  Micrographic Surgery and Dermatologic Oncology (MSDO) Fellow      Scribe Disclosure:  I, Yoko Rehan, am serving as a scribe to document services personally performed by Jean Carlos Peralta MD based on data collection and the provider's statements to me.     Attestation signed by Jean Carlos Peralta MD at 5/31/2022 12:06 PM:  Attending Attestation  I attest that the Fellow recorded the interview and exam that I personally performed.  I have reviewed the note and edited it as necessary.    Jean Carlos Peralta M.D.  Professor  Director of Dermatologic Surgery  Department of Dermatology  [facility]        Again, thank you for allowing me to participate in the care of your patient.      Sincerely,    Jean Carlos Peralta MD

## 2022-05-25 NOTE — NURSING NOTE
Chief Complaint   Patient presents with     Derm Problem     Patient Is here today for a follow up right cheek post mohs, would like chest looked at had excision on right and left chest.      Lisa PORTER CMA

## 2022-05-25 NOTE — PROGRESS NOTES
Dermatologic Surgery Clinic Note    May 25, 2022    Dermatology Problem List:  1. Melanoma in situ, left back, s/p bx 4/1/22, s/p excision  2. NMSC  - SCC, right sideburn, s/p bx 4/1/22, s/p MMS 4/26/22  - SCC, right lateral cheek, s/p bx 4/1/22, s/p MMS 4/26/22  - BCC, right upper chest, s/p bx 4/1/22, s/p excision 05/2023  - BCC, left upper chest, s/p bx 4/1/22, s/p excision 05/2023  3. 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 Cellulitis, chest  - Doxycycline 100 mg BID x10 days initiated 5/25/2022    Subjective: The patient is a 61 year old man who presents today for a wound check after recent dermatologic surgery on his right cheek. No concerns for infection today on the right cheek. The patient continues with daily wound cares as recommended.    He also would like excision spots on his chest evaluated as he thinks these may be infected.     No other associated symptoms, modifying factors, or prior treatments, except when noted above. The patient denies any constitutional symptoms, lymphadenopathy, unintentional weight loss or decreased appetite. No other skin concerns today.      Objective:   Gen: This is a well appearing individual in no acute distress. The patient is alert and oriented x 3.  An exam of the face and chest was performed today and visualized the following:  - At the chest, there are 2 incision sites with surrounding erythema, mild purulent drainage, and tenderness to palpation.    Assessment and Plan:     # Cellulitis, chest  S/p excision on the R and L upper check at BCC sites. Given signs of infection on examination today, will obtain a bacteria swab and prescribe antibiotics as indicated below.   - Start doxycycline 100 mg BID for 10 days. Patient counseled on potential side effects.   - Bacteria swab obtained today  - The patient was told to continue with wound cares until the area(s) is/are no longer crusted.   - The patient should follow up with dermatologic surgery PRN, as well as  continue with regular skin exams in general dermatology clinic. Referral for general dermatology placed for skin check within 6 months.    The patient was discussed with and evaluated by attending physician, Jean Carlos Peralta MD.    Edgardo Leong MD  Micrographic Surgery and Dermatologic Oncology (MSDO) Fellow      Scribe Disclosure:  Yoko BLANCO, am serving as a scribe to document services personally performed by Jean Carlos Peralta MD based on data collection and the provider's statements to me.

## 2022-05-25 NOTE — LETTER
Date:May 31, 2022      Provider requested that no letter be sent. Do not send.       [facility]

## 2022-05-25 NOTE — PATIENT INSTRUCTIONS
Information about doxycycline    - We recommended an oral antibiotic called doxycycline, which treats bacteria and inflammation.  - Please take doxycycline 100 mg once in the morning and once in the evening with food. The medication should be taken with food to avoid stomach upset.  - Doxycycline makes you more sensitive to the sun while you are taking it, so make sure to stay out of the sun and wear sun screen if going outside.  - Doxycycline can lead to reflux-like symptoms (such as a burning sensation in the throat) if you take it and then lie down right afterward. When taking the medication at night, make sure to take it with a full glass of water and more than 30 minutes before lying down to sleep to help avoid this.

## 2022-05-29 LAB
BACTERIA WND CULT: ABNORMAL
GRAM STAIN RESULT: ABNORMAL

## 2022-11-10 ENCOUNTER — TELEPHONE (OUTPATIENT)
Dept: DERMATOLOGY | Facility: CLINIC | Age: 62
End: 2022-11-10

## 2022-11-10 NOTE — TELEPHONE ENCOUNTER
[facility] Call Center    Phone Message    May a detailed message be left on voicemail: yes     Reason for Call: Other: Pt tried to reschedule 11/4 appt but soonest available is 4/20. Able to resend ref with urgency? Fit him in somehere? Or is that not needed? Please call [phone]. Thanks!     Action Taken: Message routed to:  [facility] DERM    Travel Screening: Not Applicable

## 2022-11-10 NOTE — TELEPHONE ENCOUNTER
Spoke with patient, he is wanting to be seen for an appointment because he is having spots of concerns, has history of skin cancer, and is in pain due to some of the spots. At this time, 11.10.22, there are no current appointments. There is an appointment at 11.18.22 with Alejandra Mahmood, advised patient I am not allowed to schedule greater than 1 week in advance, told patient to call back at 0800 tomorrow, 11.11.22 to schedule in this appointment.    Mariana GOMEZ RN

## 2022-11-11 NOTE — TELEPHONE ENCOUNTER
Writer called patient to let him know he has been scheduled, patient acknowledged.    Mariana GOMEZ RN

## 2022-11-17 NOTE — PROGRESS NOTES
[facility] Dermatology Note  Encounter Date: Nov 18, 2022  Office Visit     Dermatology Problem List:  # NUB x 6 (see location below) - s/p bx 11/18/22  # LTM - nasal tip - previously several MMS procedures, but patient noting some scaling and irritation an occasional bleeding - will treat with efudex/calcipotriene, but if persists consider bx for recurrence  1. Melanoma in situ, left back, s/p bx 4/1/22, s/p excision  2. NMSC  - niacinamide 500 mg po BID  - SCC, right sideburn, s/p bx 4/1/22, s/p MMS 4/26/22  - SCC, right lateral cheek, s/p bx 4/1/22, s/p MMS 4/26/22  - BCC, right upper chest, s/p bx 4/1/22, s/p excision 05/2022  - BCC, left upper chest, s/p bx 4/1/22, s/p excision 05/2022  3. 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 Cellulitis, chest  - Doxycycline 100 mg BID x10 days initiated 5/25/2022  5. Diffuse actinic damage   - Efudex/calcipotriene cream (initiaited to face 11/18/2022)  - previously completed efudex alone on the chest with moderate to good response  - future: consider PDT  ____________________________________________    Assessment & Plan:    # NUB, right anterior shoulder, ddx: BCC vs other  # NUB, right mid cheek, ddx: HAK vs SCC vs other  # NUB, left lateral cheek, ddx: HAK vs SCC vs other  # NUB, mid perispinal back, ddx: melanoma vs lentigo maligna vs other  # NUB, left scapular back, ddx: BCC vs AK vs other  # NUB, right medial clavicle, ddx: HAK vs NMSC vs other  - Shave biopsy today, see procedure note below    # LTM - nasal tip - previously several MMS procedures on the nose, but patient noting some scaling and irritation an occasional bleeding - will treat with efudex/calcipotriene for now, but if persists consider bx for recurrence    # Benign lesions: Multiple benign nevi, solar lentigos, seborrheic keratoses. Explained to patient benign nature of lesion. No treatment is necessary at this time unless the lesion changes or becomes symptomatic.   - ABCDs of  melanoma were discussed and self skin checks were advised.  - Sun precaution was advised including the use of sun screens of SPF 30 or higher, sun protective clothing, and avoidance of tanning beds.    # Hx MIS, left back. No clinical evidence of recurrence.  # Hx NMSC. No clinical evidence of recurrence.  - Continue regular skin exams, for now every 3-6mo given extent of damage at this time, hopefully can work toward less frequent visits  - Advised to monitor for any growing changing or painful lesions  - Continue niacinamide 500 mg BID as chemoprohylaxsis. Refilled today.     # Diffuse actinic damage - primarily face, chest and shoulders/upper back  - wait until biopsy sites heal before initiating cream  - previously completed efudex x 14 days to the chest with adequate response  - Restart calcipotriene + Efudex 5% cream BID to the face for four days  - edu on potential adverse reactions discussed    Procedures Performed:   - Shave biopsy x6 procedure note, location(s): see above. After discussion of benefits and risks including but not limited to bleeding, infection, scar, incomplete removal, recurrence, and non-diagnostic biopsy, written consent and photographs were obtained. The area was cleaned with isopropyl alcohol. 0.5mL of 1% lidocaine with epinephrine was injected to obtain adequate anesthesia of lesion(s). Shave biopsy at site(s) performed. Hemostasis was achieved with aluminium chloride. Petrolatum ointment and a sterile dressing were applied. The patient tolerated the procedure and no complications were noted. The patient was provided with verbal and written post care instructions.     Follow-up: 3 month(s) in-person, or earlier for new or changing lesions    Staff and Scribe:      Scribe Disclosure:  DESMOND BLANCO, am serving as a scribe to document services personally performed by Alejandra Mahmood PA-C based on data collection and the provider's statements to me.   Provider Disclosure:   The  documentation recorded by the scribe accurately reflects the services I personally performed and the decisions made by me.    All risks, benefits and alternatives were discussed with patient.  Patient is in agreement and understands the assessment and plan.  All questions were answered.    Alejandra Mahmood PA-C, MPAS  [facility] [place]: Phone: [phone], Fax: [phone]  [facility]: Phone: [phone],  Fax: [phone]  [facility]: Phone: [phone], Fax: [phone]  ____________________________________________    CC: Skin Check (FBSE.  Has a few spots of concern on the face and right shoulder. )    HPI:  Mr. Cornelius Ruiz is a(n) 62 year old male who presents today as a return patient for FBSE. Last seen by Dr. Peralta on 5/25/22, at which time the patient was started on doxycycline for treatment of cellulitis on the chest. This has since resolved.    Today, the patient reports several spots of concern on his face and right shoulder that he would like examined. Many of these spots are bleeding, itching or painful. Significant hx of NMSC and actinic damage as well as MIS on the back. Previously had numerous skin cancers removed at outside dermatology facility.    Patient is otherwise feeling well, without additional skin concerns.    Labs Reviewed:  N/A    Physical Exam:  Vitals: There were no vitals taken for this visit.  SKIN: Full skin, which includes the head/face, both arms, chest, back, abdomen,both legs, genitalia and/or groin buttocks, digits and/or nails, was examined.  - Right anterior shoulder, 1.5 cm pink scaly nodule.  - Right mid cheek, 8 mm scaly papule.   - Left lateral cheek, 1 cm scaly plaque.  - Mid perispinal back, 8 mm brown and pink macule.  - Left scapular back, 1 cm pink shiny papule.  - Right medial clavicle, 1 cm pink scaly plaque.  - Multiple regular brown pigmented macules and  papules are identified on the trunk and extremities.   - Scattered brown macules on sun exposed areas.  - There are waxy stuck on tan to brown papules on the trunk and extremities.   - There are erythematous macules with overyling adherent scale scattered on the face.  - Extensive actinic damage across the shoulders.  - Well healed scar on the left back.  - No other lesions of concern on areas examined.                       Medications:  Current Outpatient Medications   Medication     aspirin 81 MG EC tablet     cephALEXin (KEFLEX) 500 MG capsule     ciprofloxacin (CIPRO) 500 MG/5ML (10%) suspension     niacinamide 500 MG tablet     sildenafil (VIAGRA) 50 MG tablet     No current facility-administered medications for this visit.      Past Medical History:   There is no problem list on file for this patient.    No past medical history on file.

## 2022-11-18 ENCOUNTER — OFFICE VISIT (OUTPATIENT)
Dept: DERMATOLOGY | Facility: CLINIC | Age: 62
End: 2022-11-18
Payer: COMMERCIAL

## 2022-11-18 DIAGNOSIS — L90.5 SCAR: ICD-10-CM

## 2022-11-18 DIAGNOSIS — Z85.828 HISTORY OF NONMELANOMA SKIN CANCER: Primary | ICD-10-CM

## 2022-11-18 DIAGNOSIS — L81.4 SOLAR LENTIGO: ICD-10-CM

## 2022-11-18 DIAGNOSIS — D49.2 NEOPLASM OF UNSPECIFIED BEHAVIOR OF BONE, SOFT TISSUE, AND SKIN: ICD-10-CM

## 2022-11-18 DIAGNOSIS — D22.9 MULTIPLE BENIGN NEVI: ICD-10-CM

## 2022-11-18 DIAGNOSIS — L57.0 ACTINIC KERATOSIS: ICD-10-CM

## 2022-11-18 DIAGNOSIS — L82.1 SEBORRHEIC KERATOSIS: ICD-10-CM

## 2022-11-18 PROCEDURE — 99214 OFFICE O/P EST MOD 30 MIN: CPT | Mod: 25 | Performed by: PHYSICIAN ASSISTANT

## 2022-11-18 PROCEDURE — 11102 TANGNTL BX SKIN SINGLE LES: CPT | Performed by: PHYSICIAN ASSISTANT

## 2022-11-18 PROCEDURE — 88342 IMHCHEM/IMCYTCHM 1ST ANTB: CPT | Mod: TC | Performed by: PHYSICIAN ASSISTANT

## 2022-11-18 PROCEDURE — 88305 TISSUE EXAM BY PATHOLOGIST: CPT | Mod: 26 | Performed by: DERMATOLOGY

## 2022-11-18 PROCEDURE — 11103 TANGNTL BX SKIN EA SEP/ADDL: CPT | Performed by: PHYSICIAN ASSISTANT

## 2022-11-18 PROCEDURE — 88342 IMHCHEM/IMCYTCHM 1ST ANTB: CPT | Mod: 26 | Performed by: DERMATOLOGY

## 2022-11-18 RX ORDER — FLUOROURACIL 50 MG/G
CREAM TOPICAL
Qty: 40 G | Refills: 1 | Status: SHIPPED | OUTPATIENT
Start: 2022-11-18

## 2022-11-18 RX ORDER — CALCIPOTRIENE 50 UG/G
CREAM TOPICAL
Qty: 120 G | Refills: 1 | Status: SHIPPED | OUTPATIENT
Start: 2022-11-18

## 2022-11-18 RX ORDER — NIACINAMIDE 500 MG
500 TABLET ORAL 2 TIMES DAILY WITH MEALS
Qty: 120 TABLET | Refills: 11 | Status: SHIPPED | OUTPATIENT
Start: 2022-11-18 | End: 2024-02-29

## 2022-11-18 ASSESSMENT — PAIN SCALES - GENERAL: PAINLEVEL: MODERATE PAIN (5)

## 2022-11-18 NOTE — NURSING NOTE
Lidocaine-epinephrine 1-1:009210 % injection   2mL once for one use, starting 11/18/2022 ending 11/18/2022,  2mL disp, R-0, injection  Injected by Alejandra Mahmood PA-C

## 2022-11-18 NOTE — LETTER
Date:November 18, 2022      Provider requested that no letter be sent. Do not send.       [facility]

## 2022-11-18 NOTE — PATIENT INSTRUCTIONS
Patient Education     Checking for Skin Cancer  You can find cancer early by checking your skin each month. There are 3 kinds of skin cancer. They are melanoma, basal cell carcinoma, and squamous cell carcinoma. Doing monthly skin checks is the best way to find new marks or skin changes. Follow the instructions below for checking your skin.   The ABCDEs of checking moles for melanoma   Check your moles or growths for signs of melanoma using ABCDE:   Asymmetry: the sides of the mole or growth don t match  Border: the edges are ragged, notched, or blurred  Color: the color within the mole or growth varies  Diameter: the mole or growth is larger than 6 mm (size of a pencil eraser)  Evolving: the size, shape, or color of the mole or growth is changing (evolving is not shown in the images below)    Checking for other types of skin cancer  Basal cell carcinoma or squamous cell carcinoma have symptoms such as:     A spot or mole that looks different from all other marks on your skin  Changes in how an area feels, such as itching, tenderness, or pain  Changes in the skin's surface, such as oozing, bleeding, or scaliness  A sore that does not heal  New swelling or redness beyond the border of a mole    Who s at risk?  Anyone can get skin cancer. But you are at greater risk if you have:   Fair skin, light-colored hair, or light-colored eyes  Many moles or abnormal moles on your skin  A history of sunburns from sunlight or tanning beds  A family history of skin cancer  A history of exposure to radiation or chemicals  A weakened immune system  If you have had skin cancer in the past, you are at risk for recurring skin cancer.   How to check your skin  Do your monthly skin checkups in front of a full-length mirror. Check all parts of your body, including your:   Head (ears, face, neck, and scalp)  Torso (front, back, and sides)  Arms (tops, undersides, upper, and lower armpits)  Hands (palms, backs, and fingers, including  under the nails)  Buttocks and genitals  Legs (front, back, and sides)  Feet (tops, soles, toes, including under the nails, and between toes)  If you have a lot of moles, take digital photos of them each month. Make sure to take photos both up close and from a distance. These can help you see if any moles change over time.   Most skin changes are not cancer. But if you see any changes in your skin, call your doctor right away. Only he or she can diagnose a problem. If you have skin cancer, seeing your doctor can be the first step toward getting the treatment that could save your life.   [employer] last reviewed this educational content on 4/1/2019 2000-2020 [employer] [address] [place] [address], [place] [address]. All rights reserved. This information is not intended as a substitute for professional medical care. Always follow your healthcare professional's instructions.       When should I call my doctor?  If you are worsening or not improving, please, contact us or seek urgent care as noted below.     Who should I call with questions (adults)?  [facility] (adult and pediatric): [phone]  [facility] (adult): [phone]  For urgent needs outside of business hours call the [facility] at [phone] and ask for the dermatology resident on call to be paged  If this is a medical emergency and you are unable to reach an ER, Call [phone]    Who should I call with questions (pediatric)?  [facility]- Pediatric Dermatology  Dr. Rosa Maria Montanez, Dr. Jonas Short, Dr. Chelsea Bowden, PAIGE Odell, Dr. Sylvia Hart, Dr. Shasha Mckeon & Dr. Ean Riley  Non-urgent nurse triage line; [phone]- [facility] Coordinatorestephanie Hernandez (/Complex ) [phone]    If you need a prescription refill, please contact your pharmacy. Refills are approved or denied by our  Physicians during normal business hours, Monday through Fridays  Per office policy, refills will not be granted if you have not been seen within the past year (or sooner depending on your child's condition)    Scheduling Information:  Pediatric Appointment Scheduling and Call Center [phone]  Radiology Scheduling- [phone]  Sedation Unit Scheduling- [phone]  [place] Scheduling- General [phone]; Pediatric Dermatology [phone]  Main  Services: [phone]  Arabic: [phone]  Icelandic: [phone]  Hmong/Algerian/Latvian: [phone]  Preadmission Nursing Department Fax Number: [phone] (Fax all pre-operative paperwork to this number)    For urgent matters arising during evenings, weekends, or holidays that cannot wait for normal business hours please call [phone] and ask for the dermatology resident on call to be paged. Wound Care After a Biopsy    What is a skin biopsy?  A skin biopsy allows the doctor to examine a very small piece of tissue under the microscope to determine the diagnosis and the best treatment for the skin condition. A local anesthetic (numbing medicine)  is injected with a very small needle into the skin area to be tested. A small piece of skin is taken from the area. Sometimes a suture (stitch) is used.     What are the risks of a skin biopsy?  I will experience scar, bleeding, swelling, pain, crusting and redness. I may experience incomplete removal or recurrence. Risks of this procedure are excessive bleeding, bruising, infection, nerve damage, numbness, thick (hypertrophic or keloidal) scar and non-diagnostic biopsy.    How should I care for my wound for the first 24 hours?  Keep the wound dry and covered for 24 hours  If it bleeds, hold direct pressure on the area for 15 minutes. If bleeding does not stop then go to the emergency room  Avoid strenuous exercise the first 1-2 days or as your doctor instructs you    How should I care for the wound  after 24 hours?  After 24 hours, remove the bandage  You may bathe or shower as normal  If you had a scalp biopsy, you can shampoo as usual and can use shower water to clean the biopsy site daily  Clean the wound twice a day with gentle soap and water  Do not scrub, be gentle  Apply white petroleum/Vaseline after cleaning the wound with a cotton swab or a clean finger, and keep the site covered with a Bandaid /bandage. Bandages are not necessary with a scalp biopsy  If you are unable to cover the site with a Bandaid /bandage, re-apply ointment 2-3 times a day to keep the site moist. Moisture will help with healing  Avoid strenuous activity for first 1-2 days  Avoid lakes, rivers, pools, and oceans until the stitches are removed or the site is healed    How do I clean my wound?  Wash hands thoroughly with soap or use hand  before all wound care  Clean the wound with gentle soap and water  Apply white petroleum/Vaseline  to wound after it is clean  Replace the Bandaid /bandage to keep the wound covered for the first few days or as instructed by your doctor  If you had a scalp biopsy, warm shower water to the area on a daily basis should suffice    What should I use to clean my wound?   Cotton-tipped applicators (Qtips )  White petroleum jelly (Vaseline ). Use a clean new container and use Q-tips to apply.  Bandaids   as needed  Gentle soap     How should I care for my wound long term?  Do not get your wound dirty  Keep up with wound care for one week or until the area is healed.  A small scab will form and fall off by itself when the area is completely healed. The area will be red and will become pink in color as it heals. Sun protection is very important for how your scar will turn out. Sunscreen with an SPF 30 or greater is recommended once the area is healed.  You should have some soreness but it should be mild and slowly go away over several days. Talk to your doctor about using tylenol for pain,    When  should I call my doctor?  If you have increased:   Pain or swelling  Pus or drainage (clear or slightly yellow drainage is ok)  Temperature over 100F  Spreading redness or warmth around wound    When will I hear about my results?  The biopsy results can take 2 weeks to come back.  Your results will automatically release to [facility] before your provider has even reviewed them.  The clinic will call you with the results, send you a [facility] message, or have you schedule a follow-up clinic or phone time to discuss the results.  Contact our clinics if you do not hear from us in 2 weeks.    Who should I call with questions?  [facility]: [phone]  [facility]: [phone]  For urgent needs outside of business hours call the [facility] at [phone] and ask for the dermatology resident on call

## 2022-11-18 NOTE — LETTER
11/18/2022       RE: Cornelius Ruiz  [address]  [facility] [address]     Dear Colleague,    Thank you for referring your patient, Cornelius Ruiz, to the [facility] [place] at [facility]. Please see a copy of my visit note below.    [facility] Dermatology Note  Encounter Date: Nov 18, 2022  Office Visit     Dermatology Problem List:  # NUB x 6 (see location below) - s/p bx 11/18/22  # LTM - nasal tip - previously several MMS procedures, but patient noting some scaling and irritation an occasional bleeding - will treat with efudex/calcipotriene, but if persists consider bx for recurrence  1. Melanoma in situ, left back, s/p bx 4/1/22, s/p excision  2. NMSC  - niacinamide 500 mg po BID  - SCC, right sideburn, s/p bx 4/1/22, s/p MMS 4/26/22  - SCC, right lateral cheek, s/p bx 4/1/22, s/p MMS 4/26/22  - BCC, right upper chest, s/p bx 4/1/22, s/p excision 05/2022  - BCC, left upper chest, s/p bx 4/1/22, s/p excision 05/2022  3. 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 Cellulitis, chest  - Doxycycline 100 mg BID x10 days initiated 5/25/2022  5. Diffuse actinic damage   - Efudex/calcipotriene cream (initiaited to face 11/18/2022)  - previously completed efudex alone on the chest with moderate to good response  - future: consider PDT  ____________________________________________    Assessment & Plan:    # NUB, right anterior shoulder, ddx: BCC vs other  # NUB, right mid cheek, ddx: HAK vs SCC vs other  # NUB, left lateral cheek, ddx: HAK vs SCC vs other  # NUB, mid perispinal back, ddx: melanoma vs lentigo maligna vs other  # NUB, left scapular back, ddx: BCC vs AK vs other  # NUB, right medial clavicle, ddx: HAK vs NMSC vs other  - Shave biopsy today, see procedure note below    # LTM - nasal tip - previously several MMS procedures on the nose, but patient noting some scaling and irritation an occasional bleeding  - will treat with efudex/calcipotriene for now, but if persists consider bx for recurrence    # Benign lesions: Multiple benign nevi, solar lentigos, seborrheic keratoses. Explained to patient benign nature of lesion. No treatment is necessary at this time unless the lesion changes or becomes symptomatic.   - ABCDs of melanoma were discussed and self skin checks were advised.  - Sun precaution was advised including the use of sun screens of SPF 30 or higher, sun protective clothing, and avoidance of tanning beds.    # Hx MIS, left back. No clinical evidence of recurrence.  # Hx NMSC. No clinical evidence of recurrence.  - Continue regular skin exams, for now every 3-6mo given extent of damage at this time, hopefully can work toward less frequent visits  - Advised to monitor for any growing changing or painful lesions  - Continue niacinamide 500 mg BID as chemoprohylaxsis. Refilled today.     # Diffuse actinic damage - primarily face, chest and shoulders/upper back  - wait until biopsy sites heal before initiating cream  - previously completed efudex x 14 days to the chest with adequate response  - Restart calcipotriene + Efudex 5% cream BID to the face for four days  - edu on potential adverse reactions discussed    Procedures Performed:   - Shave biopsy x6 procedure note, location(s): see above. After discussion of benefits and risks including but not limited to bleeding, infection, scar, incomplete removal, recurrence, and non-diagnostic biopsy, written consent and photographs were obtained. The area was cleaned with isopropyl alcohol. 0.5mL of 1% lidocaine with epinephrine was injected to obtain adequate anesthesia of lesion(s). Shave biopsy at site(s) performed. Hemostasis was achieved with aluminium chloride. Petrolatum ointment and a sterile dressing were applied. The patient tolerated the procedure and no complications were noted. The patient was provided with verbal and written post care instructions.      Follow-up: 3 month(s) in-person, or earlier for new or changing lesions    Staff and Scribe:      Scribe Disclosure:  I, DESMOND QUISPE, am serving as a scribe to document services personally performed by Alejandra Mahmood PA-C based on data collection and the provider's statements to me.   Provider Disclosure:   The documentation recorded by the scribe accurately reflects the services I personally performed and the decisions made by me.    All risks, benefits and alternatives were discussed with patient.  Patient is in agreement and understands the assessment and plan.  All questions were answered.    Alejandra Mahmood PA-C, [facility]  [facility] [place]: Phone: [phone], Fax: [phone]  [facility]: Phone: [phone],  Fax: [phone]  [facility]: Phone: [phone], Fax: [phone]  ____________________________________________    CC: Skin Check (FBSE.  Has a few spots of concern on the face and right shoulder. )    HPI:  Mr. Cornelius Ruiz is a(n) 62 year old male who presents today as a return patient for FBSE. Last seen by Dr. Peralta on 5/25/22, at which time the patient was started on doxycycline for treatment of cellulitis on the chest. This has since resolved.    Today, the patient reports several spots of concern on his face and right shoulder that he would like examined. Many of these spots are bleeding, itching or painful. Significant hx of NMSC and actinic damage as well as MIS on the back. Previously had numerous skin cancers removed at outside dermatology facility.    Patient is otherwise feeling well, without additional skin concerns.    Labs Reviewed:  N/A    Physical Exam:  Vitals: There were no vitals taken for this visit.  SKIN: Full skin, which includes the head/face, both arms, chest, back, abdomen,both legs, genitalia and/or groin buttocks, digits and/or nails, was examined.  - Right  anterior shoulder, 1.5 cm pink scaly nodule.  - Right mid cheek, 8 mm scaly papule.   - Left lateral cheek, 1 cm scaly plaque.  - Mid perispinal back, 8 mm brown and pink macule.  - Left scapular back, 1 cm pink shiny papule.  - Right medial clavicle, 1 cm pink scaly plaque.  - Multiple regular brown pigmented macules and papules are identified on the trunk and extremities.   - Scattered brown macules on sun exposed areas.  - There are waxy stuck on tan to brown papules on the trunk and extremities.   - There are erythematous macules with overyling adherent scale scattered on the face.  - Extensive actinic damage across the shoulders.  - Well healed scar on the left back.  - No other lesions of concern on areas examined.                       Medications:  Current Outpatient Medications   Medication     aspirin 81 MG EC tablet     cephALEXin (KEFLEX) 500 MG capsule     ciprofloxacin (CIPRO) 500 MG/5ML (10%) suspension     niacinamide 500 MG tablet     sildenafil (VIAGRA) 50 MG tablet     No current facility-administered medications for this visit.      Past Medical History:   There is no problem list on file for this patient.    No past medical history on file.           Again, thank you for allowing me to participate in the care of your patient.      Sincerely,    Alejandra Mahmood PA-C

## 2022-11-18 NOTE — NURSING NOTE
Dermatology Rooming Note    Cornelius Ruiz's goals for this visit include:   Chief Complaint   Patient presents with     Skin Check     FBSE.  Has a few spots of concern on the face and right shoulder.      Shagufta Ayon CMA

## 2022-11-22 LAB
PATH REPORT.COMMENTS IMP SPEC: ABNORMAL
PATH REPORT.COMMENTS IMP SPEC: YES
PATH REPORT.FINAL DX SPEC: ABNORMAL
PATH REPORT.GROSS SPEC: ABNORMAL
PATH REPORT.MICROSCOPIC SPEC OTHER STN: ABNORMAL
PATH REPORT.RELEVANT HX SPEC: ABNORMAL

## 2022-11-23 ENCOUNTER — TELEPHONE (OUTPATIENT)
Dept: DERMATOLOGY | Facility: CLINIC | Age: 62
End: 2022-11-23

## 2022-11-23 DIAGNOSIS — C43.9 LENTIGO MALIGNA MELANOMA (H): Primary | ICD-10-CM

## 2022-11-23 DIAGNOSIS — C44.91 BASAL CELL CARCINOMA: ICD-10-CM

## 2022-11-23 DIAGNOSIS — C44.92 SCC (SQUAMOUS CELL CARCINOMA): ICD-10-CM

## 2022-11-23 NOTE — TELEPHONE ENCOUNTER
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 I spoke with him and discussed results. He expressed understanding. Has had multiple MMS in the past, likely will not need consult would be my guess.

## 2022-11-28 ENCOUNTER — TELEPHONE (OUTPATIENT)
Dept: DERMATOLOGY | Facility: CLINIC | Age: 62
End: 2022-11-28

## 2022-11-28 NOTE — TELEPHONE ENCOUNTER
Called patient to schedule surgery with Dr. Peralta    Date of Surgery: 1/11    Surgery type: melanoma and scc mohs    Consult scheduled: No    Has patient had mohs with us before? Yes    Additional comments: would like sooner.       Areli Dailey on 11/28/2022 at 2:02 PM

## 2022-11-28 NOTE — TELEPHONE ENCOUNTER
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    Return pt. No consult needed as far as I can tell.    First Melanoma opening is currently 1/11. Is this acceptable? Please advise.    Areli Dailey, Procedure  11/28/2022 11:41 AM

## 2022-12-01 NOTE — TELEPHONE ENCOUNTER
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 would like sooner.     RECORDS REQUESTED FROM:       Clinic name Comments Records Status Imaging Status   Derm 11.18.22  Path # [id number] [facility]

## 2022-12-15 ENCOUNTER — PATIENT OUTREACH (OUTPATIENT)
Dept: DERMATOLOGY | Facility: CLINIC | Age: 62
End: 2022-12-15

## 2022-12-15 NOTE — TELEPHONE ENCOUNTER
Attempted to reach patient to schedule follow up in the Dermatology Clinic.  No answer,  LM on VM to call office and Letter mailed..    Schedule with Alejandra Mahmood PA-C or any available provider in 2/2023- per checkout ok to use POA slot.

## 2022-12-15 NOTE — LETTER
December 15, 2022      Cornelius Ruiz  [address]  [facility] [address]        Dear Cornelius Ruiz:    We recently reviewed your medical records from [facility] and found that you are due for an appointment with Alejandra Mahmood PA-C or any available provider in February 2023.  Our office has attempted to reach you via telephone and left a message.    At your earliest convenience, please call the clinic at [phone] to arrange the recommended exam and possible testing.  Please kindly mention this letter when calling so that your appointment(s) can be accurately scheduled.      Sincerely,       The Clinic Staff        Medical Record Number:  [id number]

## 2022-12-16 ENCOUNTER — TELEPHONE (OUTPATIENT)
Dept: DERMATOLOGY | Facility: CLINIC | Age: 62
End: 2022-12-16

## 2022-12-17 ENCOUNTER — HOSPITAL ENCOUNTER (OUTPATIENT)
Dept: MRI IMAGING | Facility: CLINIC | Age: 62
Discharge: HOME | End: 2022-12-17
Attending: STUDENT IN AN ORGANIZED HEALTH CARE EDUCATION/TRAINING PROGRAM | Admitting: STUDENT IN AN ORGANIZED HEALTH CARE EDUCATION/TRAINING PROGRAM
Payer: COMMERCIAL

## 2022-12-17 DIAGNOSIS — R82.998 DARK URINE: ICD-10-CM

## 2022-12-17 DIAGNOSIS — Z90.49 HISTORY OF CHOLECYSTECTOMY: ICD-10-CM

## 2022-12-17 DIAGNOSIS — Z87.19 HISTORY OF ACUTE PANCREATITIS: ICD-10-CM

## 2022-12-17 DIAGNOSIS — R11.0 CHRONIC NAUSEA: ICD-10-CM

## 2022-12-17 DIAGNOSIS — R10.11 RUQ PAIN: ICD-10-CM

## 2022-12-17 DIAGNOSIS — K52.9 CHRONIC DIARRHEA: ICD-10-CM

## 2022-12-17 PROCEDURE — A9585 GADOBUTROL INJECTION: HCPCS | Performed by: INTERNAL MEDICINE

## 2022-12-17 PROCEDURE — 255N000002 HC RX 255 OP 636: Performed by: INTERNAL MEDICINE

## 2022-12-17 PROCEDURE — 74183 MRI ABD W/O CNTR FLWD CNTR: CPT

## 2022-12-17 RX ORDER — GADOBUTROL 604.72 MG/ML
10 INJECTION INTRAVENOUS ONCE
Status: COMPLETED | OUTPATIENT
Start: 2022-12-17 | End: 2022-12-17

## 2022-12-17 RX ADMIN — GADOBUTROL 10 ML: 604.72 INJECTION INTRAVENOUS at 13:41

## 2022-12-31 ENCOUNTER — PATIENT OUTREACH (OUTPATIENT)
Dept: DERMATOLOGY | Facility: CLINIC | Age: 62
End: 2022-12-31

## 2022-12-31 NOTE — LETTER
December 31, 2022      TO: Cornelius Ruiz  [address]  [facility] [address]         Dear Cornelius Ruiz:     We recently reviewed your medical records from [facility] and found that you are due for an appointment with Alejandra Mahmood PA-C or any available provider in February 2023.  Our office has attempted to reach you via telephone and left a message.     At your earliest convenience, please call the clinic at [phone] to arrange the recommended exam and possible testing.  Please kindly mention this letter when calling so that your appointment(s) can be accurately scheduled.       Sincerely,         The Clinic Staff

## 2022-12-31 NOTE — TELEPHONE ENCOUNTER
Attempted to reach patient to schedule follow up in the Dermatology Clinic.  No answer,  LM on VM to call office and Letter mailed..    Schedule with Alejandra Mahmood PA-C.

## 2023-01-11 ENCOUNTER — OFFICE VISIT (OUTPATIENT)
Dept: DERMATOLOGY | Facility: CLINIC | Age: 63
End: 2023-01-11
Attending: PHYSICIAN ASSISTANT
Payer: COMMERCIAL

## 2023-01-11 ENCOUNTER — PRE VISIT (OUTPATIENT)
Dept: DERMATOLOGY | Facility: CLINIC | Age: 63
End: 2023-01-11

## 2023-01-11 VITALS — HEART RATE: 97 BPM | DIASTOLIC BLOOD PRESSURE: 114 MMHG | SYSTOLIC BLOOD PRESSURE: 156 MMHG

## 2023-01-11 DIAGNOSIS — C44.319 BASAL CELL CARCINOMA (BCC) OF LEFT CHEEK: ICD-10-CM

## 2023-01-11 DIAGNOSIS — C43.59 MALIGNANT MELANOMA OF BACK (H): Primary | ICD-10-CM

## 2023-01-11 DIAGNOSIS — C44.622 SQUAMOUS CELL CARCINOMA OF RIGHT SHOULDER: ICD-10-CM

## 2023-01-11 DIAGNOSIS — C44.319 BASAL CELL CARCINOMA (BCC) OF RIGHT CHEEK: ICD-10-CM

## 2023-01-11 PROCEDURE — 13132 CMPLX RPR F/C/C/M/N/AX/G/H/F: CPT | Performed by: DERMATOLOGY

## 2023-01-11 PROCEDURE — 17311 MOHS 1 STAGE H/N/HF/G: CPT | Performed by: DERMATOLOGY

## 2023-01-11 PROCEDURE — 17312 MOHS ADDL STAGE: CPT | Performed by: DERMATOLOGY

## 2023-01-11 PROCEDURE — 17313 MOHS 1 STAGE T/A/L: CPT | Performed by: DERMATOLOGY

## 2023-01-11 PROCEDURE — 88305 TISSUE EXAM BY PATHOLOGIST: CPT | Mod: TC | Performed by: DERMATOLOGY

## 2023-01-11 PROCEDURE — 88305 TISSUE EXAM BY PATHOLOGIST: CPT | Mod: 26 | Performed by: DERMATOLOGY

## 2023-01-11 PROCEDURE — 12034 INTMD RPR S/TR/EXT 7.6-12.5: CPT | Performed by: DERMATOLOGY

## 2023-01-11 PROCEDURE — 11603 EXC TR-EXT MAL+MARG 2.1-3 CM: CPT | Mod: XS | Performed by: DERMATOLOGY

## 2023-01-11 RX ORDER — INSULIN GLARGINE 100 [IU]/ML
INJECTION, SOLUTION SUBCUTANEOUS AT BEDTIME
COMMUNITY

## 2023-01-11 ASSESSMENT — PAIN SCALES - GENERAL: PAINLEVEL: MODERATE PAIN (5)

## 2023-01-11 NOTE — PATIENT INSTRUCTIONS

## 2023-01-11 NOTE — LETTER
1/11/2023       RE: Cornelius Ruiz  [id number]  [facility] [address]     Dear Colleague,    Thank you for referring your patient, Cornelius Ruiz, to the [facility] DERMATOLOGIC SURG[facility] [place] at [facility]. Please see a copy of my visit note below.    [facility] Dermatologic Surgery Excision Note    Case #: 1  Date of Service:  Jan 11, 2023  Surgery: Wide local excision  Staff Surgeon: Jean Carlos Peralta MD  Fellow Surgeon: Caleb Norman MD  Resident Surgeon: Nava Benitez MD  Nurse: Lisa Johnson CMA    Tumor Type: Melanoma  Location: mid perispinal back  Derm-Path Accession #: [id number]    Skin Lesion Size:  0.8 cm x 0.8 cm  Margin: 1 cm  Excision size: 2.8 cm x 2.8 cm  Level of Defect: Fascia   Repair Type: Intermediate linear  Repair Size: 8.2 cm  Suture Material: 3-0 Vicryl; 4-0 Prolene    INDICATIONS:  The patient was scheduled for wide local excision of the skin lesion documented above. We discussed the principles of treatment and most likely complications including scarring, bleeding, infection, incomplete excision, wound dehiscence, pain, nerve damage, and recurrence. Informed consent was obtained and the patient underwent the procedure as follows:    PROCEDURE:  With the patient in a prone position, the lesion was outlined with the margin documented above.  The lesion and the necessary margin (excised diameter) was measured and documented as above.  An ellipse was designed around the lesion to conform to relaxed skin tension lines in an effort to minimize scarring and deformity.  The lesion and surrounding skin were prepped with chlorhexidine, draped, and anesthetized with lidocaine with epinephrine. Using a #15 blade, the skin was excised along premarked lines.  The level of the defect is documented as above.  Wound margins were undermined to limit functional deformity/impairment of adjacent structures.  Bleeding vessels were controlled with electrocoagulation.  The dermis and subcutaneous tissue were closed with buried vertical mattress sutures using 3-0 Vicryl.  Epidermal approximation was meticulously refined with 4-0 Prolene sutures, resulting in a linear closure with little to no wound tension.  Blood loss was estimated to be less than 5 mL.  The area was coated with petrolatum and covered with a non-adherent dressing followed by gauze and tape. Postoperative instructions were reviewed per protocol.  The patient left alert and fully oriented.    Follow-up for suture removal: 2 weeks. Patient elected to arrange for removal of sutures.    Jean Carlos Peralta MD was immediately available for the entire surgery and was physicially present for the key portions of the procedure.      [facility] Mohs Surgery Procedure Note    Case #: 2  Date of Service:  Jan 11, 2023  Surgery: Mohs micrographic surgery (MMS)  Staff surgeon: Jean Carlos Peralta MD  Fellow surgeon: Calbe Norman MD  Resident surgeon: aNva Benitez MD  Nurse: Lisa Johnson CMA    Tumor Type: BCC  Location: Right mid cheek  Derm-Path Accession #: [id number]    Mohs Accession #:   Pre-Op Size: 1.3 cm x 1.0 cm  Final Defect Size: 2.0 cm x 1.8 cm  Number of Mohs stages: 2  Level of Defect: Fat  Local anesthetic: 6 mL 1% lidocaine with epinephrine  Repair Type: Complex linear  Repair Size: 4.0 cm  Suture Material: 4-0 Monocryl; 5-0 fast absorbing gut    Procedure:    Stage I  We discussed the principles of treatment and most likely complications including scarring, bleeding, infection, swelling, pain, crusting, nerve damage, large wound,  incomplete excision, wound dehiscence,  nerve damage, recurrence, and a second procedure may be recommended to obtain the best cosmetic or functional result.    Informed consent was obtained and the patient underwent the procedure as follows:  The patient was placed supine on the operating table.  The cancer was  identified, outlined with a marker, and verified by the patient.  The entire surgical field was prepped with chlorhexidine.  The surgical site was anesthetized using lidocaine with epinephrine.    The area of clinically apparent tumor was debulked. The layer of tissue was then surgically excised using a #15 blade and was then transferred onto a specimen sheet maintaining the orientation of the specimen. Hemostasis was obtained using electrocoagulation. The wound site was then covered with a dressing while the tissue samples were processed for examination.    The excised tissue was transported to the Mohs histology laboratory maintaining the tissue orientation.  The tissue specimen was relaxed so that the entire surgical margin was in a a single horizontal plane for sectioning and inked for precise mapping.  A precise reference map was drawn to reflect the sectioning of the specimen, colored inking of the margins, and orientation on the patient.  The tissue was processed using horizontal sectioning of the base and continuous peripheral margins.  The histopathologic sections were reviewed in conjunction with the reference map.    Total blocks: 1  Total slides: 2    Residual tumor was identified and indicated in red on the reference map, identifying the location where further tissue excision was necessary. Therefore, an additional stage of Mohs Micrographic surgery was deemed necessary.    Stage II  The patient was returned to the operating room, and the area prepped in the usual manner. The residual tumor was excised using the reference map as a guide. The specimen was transfered to a labeled specimen sheet maintaining the orientation of the specimen. Hemostasis was obtained and the wound site was covered with a dressing while the tissue was processed for examination.     The excised tissue was transported to the Mohs histology laboratory maintaining orientation. The specimen margins were inked for precise mapping and  a reference map was prepared for the is additional stage to maintain precise orientation as described above. The tissue was processed using horizontal sectioning of the base and continuous peripheral margins. The histopathologic sections were reviewed in conjunction with the reference map.     Total blocks: 1  Total slides: 2    There were no cancer cells visualized on examination, therefore Mohs surgery was complete.    REPAIR:     Due to one or more of the following factors, complex linear closure was required/indicated: Extensive undermining (equal to or greater than the maximum perpendicular width of the defect), exposure of underlying structure (bone, cartilage, tendon, named neurovascular), free margin involvement (helical rim, vermillion border, nostril rim), and/or placement of retention sutures.    After the excision of the tumor, the area was extensively and carefully undermined using tissue scissors. Hemostasis was obtained with spot electrocautery and ligation of vessels where necessary. Initial deep plication sutures of 4-0 Monocryl sutures were placed in the deep, subcutaneous, and fascial planes to appose the lateral margins. The subcutaneous and dermal layers were then closed with 4-0 Monocryl sutures. The epidermis was then carefully approximated along the length of the wound using 5-0 fast absorbing gut simple running sutures.     Estimated blood loss was less than 10 ml for all surgical sites. A sterile pressure dressing was applied and wound care instructions, with a written handout, were given. The patient was discharged from the [facility] Center alert and ambulatory.    Jean Carlos Peralta MD staffed the patient and was present for the key portions of the above procedure.     Dr. Caleb Norman (Mohs micrographic surgery fellow) performed the Mohs micrographic surgery and reconstruction under the direct supervision of Jean Carlos Peralta MD, who was present for the entire micrographic surgery and key  portions of the reconstruction, and always immediately available.      [facility] Mohs Surgery Procedure Note    Case #: 3  Date of Service:  Jan 11, 2023  Surgery: Mohs micrographic surgery (MMS)  Staff surgeon: Jean Carlos Peralta MD  Fellow surgeon: Caleb Norman MD  Resident surgeon: Nava Benitez MD  Nurse: Lisa Johnson CMA    Tumor Type: SCC  Location: Right anterior shoulder  Derm-Path Accession #: [id number]    Mohs Accession #:   Pre-Op Size: 2.3 cm x 2.0 cm  Final Defect Size: 2.6 cm x 2.6 cm  Number of Mohs stages: 1  Level of Defect: Fat  Local anesthetic: 6 mL 1% lidocaine with epinephrine  Repair Type: Intermediate linear  Repair Size: 4.0 cm  Suture Material: 4-0 Monocryl    Procedure:    Stage I  We discussed the principles of treatment and most likely complications including scarring, bleeding, infection, swelling, pain, crusting, nerve damage, large wound,  incomplete excision, wound dehiscence,  nerve damage, recurrence, and a second procedure may be recommended to obtain the best cosmetic or functional result.    Informed consent was obtained and the patient underwent the procedure as follows:  The patient was placed supine on the operating table.  The cancer was identified, outlined with a marker, and verified by the patient.  The entire surgical field was prepped with chlorhexidine.  The surgical site was anesthetized using lidocaine with epinephrine.    The area of clinically apparent tumor was debulked. The layer of tissue was then surgically excised using a #15 blade and was then transferred onto a specimen sheet maintaining the orientation of the specimen. Hemostasis was obtained using electrocoagulation. The wound site was then covered with a dressing while the tissue samples were processed for examination.    The excised tissue was transported to the Mohs histology laboratory maintaining the tissue orientation.  The tissue specimen was relaxed so that the entire  surgical margin was in a a single horizontal plane for sectioning and inked for precise mapping.  A precise reference map was drawn to reflect the sectioning of the specimen, colored inking of the margins, and orientation on the patient.  The tissue was processed using horizontal sectioning of the base and continuous peripheral margins.  The histopathologic sections were reviewed in conjunction with the reference map.    Total blocks: 1  Total slides: 2    There were no cancer cells visualized on examination, therefore Mohs surgery was complete.    REPAIR: An intermediate layered linear closure was selected as the procedure which would maximally preserve both function and cosmesis.    After the excision of the tumor, the area was carefully undermined. Hemostasis was obtained with electrocoagulation.  Closure was oriented so that the wound was parallel to the patient's relaxed skin tension lines.   The subcutaneous and dermal layers were then closed with 4-0 Monocryl sutures. The epidermis was then carefully approximated along the length of the wound using 4-0 Monocryl subcuticular sutures.     Estimated blood loss was less than 10 ml for all surgical sites. A sterile pressure dressing was applied and wound care instructions, with a written handout, were given. The patient was discharged from the Dermatologic Surgery Center alert and ambulatory.    Follow-up for suture removal: Not applicable as only dissolving sutures used    Jean Carlos Peralta MD was immediately available for the entire surgery and was physicially present for the key portions of the procedure.    Dr. Caleb Norman (Mohs micrographic surgery fellow) performed the Mohs micrographic surgery and reconstruction under the direct supervision of Jean Carlos Peralta MD, who was present for the entire micrographic surgery and key portions of the reconstruction, and always immediately available.      [facility] Mohs Surgery Procedure Note    Case #:  4  Date of Service:  Jan 11, 2023  Surgery: Mohs micrographic surgery (MMS)  Staff surgeon: Jean Carlos Peralta MD  Fellow surgeon: Caleb Norman MD  Resident surgeon: Nava Benitez MD  Nurse: Lisa Johnson CMA    Tumor Type: SCC  Location: Left lateral cheek  Derm-Path Accession #: [id number]     Mohs Accession #:   Pre-Op Size: 1.7 cm x 1.9 cm  Final Defect Size: 2.2 cm x 2.2 cm  Number of Mohs stages: 1  Level of Defect: Fat  Local anesthetic: 6 mL 1% lidocaine with epinephrine  Repair Type: Complex linear  Repair Size: 3.5 cm  Suture Material: 4-0 Monocryl; 5-0 fast absorbing gut    Procedure:    Stage I  We discussed the principles of treatment and most likely complications including scarring, bleeding, infection, swelling, pain, crusting, nerve damage, large wound,  incomplete excision, wound dehiscence,  nerve damage, recurrence, and a second procedure may be recommended to obtain the best cosmetic or functional result.    Informed consent was obtained and the patient underwent the procedure as follows:  The patient was placed supine on the operating table.  The cancer was identified, outlined with a marker, and verified by the patient.  The entire surgical field was prepped with chlorhexidine.  The surgical site was anesthetized using lidocaine with epinephrine.    The area of clinically apparent tumor was debulked. The layer of tissue was then surgically excised using a #15 blade and was then transferred onto a specimen sheet maintaining the orientation of the specimen. Hemostasis was obtained using electrocoagulation. The wound site was then covered with a dressing while the tissue samples were processed for examination.    The excised tissue was transported to the Mohs histology laboratory maintaining the tissue orientation.  The tissue specimen was relaxed so that the entire surgical margin was in a a single horizontal plane for sectioning and inked for precise mapping.  A precise reference map was  drawn to reflect the sectioning of the specimen, colored inking of the margins, and orientation on the patient.  The tissue was processed using horizontal sectioning of the base and continuous peripheral margins.  The histopathologic sections were reviewed in conjunction with the reference map.    Total blocks: 1  Total slides: 4    There were no cancer cells visualized on examination, therefore Mohs surgery was complete.    REPAIR:     Due to one or more of the following factors, complex linear closure was required/indicated: Extensive undermining (equal to or greater than the maximum perpendicular width of the defect), exposure of underlying structure (bone, cartilage, tendon, named neurovascular), free margin involvement (helical rim, vermillion border, nostril rim), and/or placement of retention sutures.    After the excision of the tumor, the area was extensively and carefully undermined using tissue scissors. Hemostasis was obtained with spot electrocautery and ligation of vessels where necessary. Initial deep plication sutures of 4-0 Monocryl sutures were placed in the deep, subcutaneous, and fascial planes to appose the lateral margins. The subcutaneous and dermal layers were then closed with 4-0 Monocryl sutures. The epidermis was then carefully approximated along the length of the wound using 5-0 fast absorbing gut simple running sutures.     Estimated blood loss was less than 10 ml for all surgical sites. A sterile pressure dressing was applied and wound care instructions, with a written handout, were given. The patient was discharged from the Dermatologic Surgery Center alert and ambulatory.    Jean Carlos Peralta MD staffed the patient and was present for the key portions of the above procedure.     Dr. Caleb Norman (Mohs micrographic surgery fellow) performed the Mohs micrographic surgery and reconstruction under the direct supervision of Jean Carlos Peralta MD, who was present for the entire micrographic surgery and  key portions of the reconstruction, and always immediately available.    Caleb Norman MD  Dermatology, PGY-5  Mohs surgery fellow    Scribe Disclosure:  I, Hussein Lazo, am serving as a scribe to document services personally performed by Jean Carlos Peralta MD based on data collection and the provider's statements to me.       Attending attestation:  I was present for key elements of the procedure and immediately available for all other portions of the procedure.  I have reviewed the note and edited it as necessary.    Jean Carlos Peralta M.D.  Professor  Director of Dermatologic Surgery  Department of Dermatology  [facility]    Dermatology Surgery Clinic  [facility] [place] [address]        Again, thank you for allowing me to participate in the care of your patient.      Sincerely,    Jean Carlos Peralta MD

## 2023-01-11 NOTE — LETTER
Date:January 23, 2023      Patient was self referred, no letter generated. Do not send.        [facility] Health Information

## 2023-01-11 NOTE — PROGRESS NOTES
[facility] Dermatologic Surgery Excision Note    Case #: 1  Date of Service:  Jan 11, 2023  Surgery: Wide local excision  Staff Surgeon: Jean Carlos Peralta MD  Fellow Surgeon: Caleb Norman MD  Resident Surgeon: Nava Benitez MD  Nurse: Lisa Johnson CMA    Tumor Type: Melanoma  Location: mid perispinal back  Derm-Path Accession #: [id number]    Skin Lesion Size:  0.8 cm x 0.8 cm  Margin: 1 cm  Excision size: 2.8 cm x 2.8 cm  Level of Defect: Fascia   Repair Type: Intermediate linear  Repair Size: 8.2 cm  Suture Material: 3-0 Vicryl; 4-0 Prolene     Wide Local Excision for Primary Cutaneous Melanoma - Excision 1 (Back)  Operation performed with curative intent Yes   Original Breslow thickness of the lesion 0.5 mm (to the tenth of a millimeter)   Clinical margin width 1 cm   Depth of excision Full-thickness skin/subcutaneous tissue down to fascia (melanoma)       INDICATIONS:  The patient was scheduled for wide local excision of the skin lesion documented above. We discussed the principles of treatment and most likely complications including scarring, bleeding, infection, incomplete excision, wound dehiscence, pain, nerve damage, and recurrence. Informed consent was obtained and the patient underwent the procedure as follows:    PROCEDURE:  With the patient in a prone position, the lesion was outlined with the margin documented above.  The lesion and the necessary margin (excised diameter) was measured and documented as above.  An ellipse was designed around the lesion to conform to relaxed skin tension lines in an effort to minimize scarring and deformity.  The lesion and surrounding skin were prepped with chlorhexidine, draped, and anesthetized with lidocaine with epinephrine. Using a #15 blade, the skin was excised along premarked lines.  The level of the defect is documented as above.  Wound margins were undermined to limit functional deformity/impairment of adjacent structures. Bleeding vessels  were controlled with electrocoagulation.  The dermis and subcutaneous tissue were closed with buried vertical mattress sutures using 3-0 Vicryl.  Epidermal approximation was meticulously refined with 4-0 Prolene sutures, resulting in a linear closure with little to no wound tension.  Blood loss was estimated to be less than 5 mL.  The area was coated with petrolatum and covered with a non-adherent dressing followed by gauze and tape. Postoperative instructions were reviewed per protocol.  The patient left alert and fully oriented.    Follow-up for suture removal: 2 weeks. Patient elected to arrange for removal of sutures.    Jean Carlos Peralta MD was immediately available for the entire surgery and was physicially present for the key portions of the procedure.      [facility] Mohs Surgery Procedure Note    Case #: 2  Date of Service:  Jan 11, 2023  Surgery: Mohs micrographic surgery (MMS)  Staff surgeon: Jean Carlos Peralta MD  Fellow surgeon: Caleb Norman MD  Resident surgeon: Nava Benitez MD  Nurse: Lisa Johnson CMA    Tumor Type: BCC  Location: Right mid cheek  Derm-Path Accession #: [id number]    Mohs Accession #:   Pre-Op Size: 1.3 cm x 1.0 cm  Final Defect Size: 2.0 cm x 1.8 cm  Number of Mohs stages: 2  Level of Defect: Fat  Local anesthetic: 6 mL 1% lidocaine with epinephrine  Repair Type: Complex linear  Repair Size: 4.0 cm  Suture Material: 4-0 Monocryl; 5-0 fast absorbing gut    Procedure:    Stage I  We discussed the principles of treatment and most likely complications including scarring, bleeding, infection, swelling, pain, crusting, nerve damage, large wound,  incomplete excision, wound dehiscence,  nerve damage, recurrence, and a second procedure may be recommended to obtain the best cosmetic or functional result.    Informed consent was obtained and the patient underwent the procedure as follows:  The patient was placed supine on the operating table.  The cancer was identified,  outlined with a marker, and verified by the patient.  The entire surgical field was prepped with chlorhexidine.  The surgical site was anesthetized using lidocaine with epinephrine.    The area of clinically apparent tumor was debulked. The layer of tissue was then surgically excised using a #15 blade and was then transferred onto a specimen sheet maintaining the orientation of the specimen. Hemostasis was obtained using electrocoagulation. The wound site was then covered with a dressing while the tissue samples were processed for examination.    The excised tissue was transported to the Mohs histology laboratory maintaining the tissue orientation.  The tissue specimen was relaxed so that the entire surgical margin was in a a single horizontal plane for sectioning and inked for precise mapping.  A precise reference map was drawn to reflect the sectioning of the specimen, colored inking of the margins, and orientation on the patient.  The tissue was processed using horizontal sectioning of the base and continuous peripheral margins.  The histopathologic sections were reviewed in conjunction with the reference map.    Total blocks: 1  Total slides: 2    Residual tumor was identified and indicated in red on the reference map, identifying the location where further tissue excision was necessary. Therefore, an additional stage of Mohs Micrographic surgery was deemed necessary.    Stage II  The patient was returned to the operating room, and the area prepped in the usual manner. The residual tumor was excised using the reference map as a guide. The specimen was transfered to a labeled specimen sheet maintaining the orientation of the specimen. Hemostasis was obtained and the wound site was covered with a dressing while the tissue was processed for examination.     The excised tissue was transported to the Mohs histology laboratory maintaining orientation. The specimen margins were inked for precise mapping and a reference  map was prepared for the is additional stage to maintain precise orientation as described above. The tissue was processed using horizontal sectioning of the base and continuous peripheral margins. The histopathologic sections were reviewed in conjunction with the reference map.     Total blocks: 1  Total slides: 2    There were no cancer cells visualized on examination, therefore Mohs surgery was complete.    REPAIR:     Due to one or more of the following factors, complex linear closure was required/indicated: Extensive undermining (equal to or greater than the maximum perpendicular width of the defect), exposure of underlying structure (bone, cartilage, tendon, named neurovascular), free margin involvement (helical rim, vermillion border, nostril rim), and/or placement of retention sutures.    After the excision of the tumor, the area was extensively and carefully undermined using tissue scissors. Hemostasis was obtained with spot electrocautery and ligation of vessels where necessary. Initial deep plication sutures of 4-0 Monocryl sutures were placed in the deep, subcutaneous, and fascial planes to appose the lateral margins. The subcutaneous and dermal layers were then closed with 4-0 Monocryl sutures. The epidermis was then carefully approximated along the length of the wound using 5-0 fast absorbing gut simple running sutures.     Estimated blood loss was less than 10 ml for all surgical sites. A sterile pressure dressing was applied and wound care instructions, with a written handout, were given. The patient was discharged from the [facility] alert and ambulatory.    Jean Carlos Peralta MD staffed the patient and was present for the key portions of the above procedure.     Dr. Caleb Norman (Mohs micrographic surgery fellow) performed the Mohs micrographic surgery and reconstruction under the direct supervision of Jean Carlos Peralta MD, who was present for the entire micrographic surgery and key portions of  the reconstruction, and always immediately available.      [facility] Mohs Surgery Procedure Note    Case #: 3  Date of Service:  Jan 11, 2023  Surgery: Mohs micrographic surgery (MMS)  Staff surgeon: Jean Carlos Peralta MD  Fellow surgeon: Caleb Norman MD  Resident surgeon: Nava Benitez MD  Nurse: Lisa Johnson CMA    Tumor Type: SCC  Location: Right anterior shoulder  Derm-Path Accession #: [id number]    Mohs Accession #:   Pre-Op Size: 2.3 cm x 2.0 cm  Final Defect Size: 2.6 cm x 2.6 cm  Number of Mohs stages: 1  Level of Defect: Fat  Local anesthetic: 6 mL 1% lidocaine with epinephrine  Repair Type: Intermediate linear  Repair Size: 4.0 cm  Suture Material: 4-0 Monocryl    Procedure:    Stage I  We discussed the principles of treatment and most likely complications including scarring, bleeding, infection, swelling, pain, crusting, nerve damage, large wound,  incomplete excision, wound dehiscence,  nerve damage, recurrence, and a second procedure may be recommended to obtain the best cosmetic or functional result.    Informed consent was obtained and the patient underwent the procedure as follows:  The patient was placed supine on the operating table.  The cancer was identified, outlined with a marker, and verified by the patient.  The entire surgical field was prepped with chlorhexidine.  The surgical site was anesthetized using lidocaine with epinephrine.    The area of clinically apparent tumor was debulked. The layer of tissue was then surgically excised using a #15 blade and was then transferred onto a specimen sheet maintaining the orientation of the specimen. Hemostasis was obtained using electrocoagulation. The wound site was then covered with a dressing while the tissue samples were processed for examination.    The excised [facility] maintaining the tissue orientation.  The tissue specimen was relaxed so that the entire surgical margin  was in a a single horizontal plane for sectioning and inked for precise mapping.  A precise reference map was drawn to reflect the sectioning of the specimen, colored inking of the margins, and orientation on the patient.  The tissue was processed using horizontal sectioning of the base and continuous peripheral margins.  The histopathologic sections were reviewed in conjunction with the reference map.    Total blocks: 1  Total slides: 2    There were no cancer cells visualized on examination, therefore Mohs surgery was complete.    REPAIR: An intermediate layered linear closure was selected as the procedure which would maximally preserve both function and cosmesis.    After the excision of the tumor, the area was carefully undermined. Hemostasis was obtained with electrocoagulation.  Closure was oriented so that the wound was parallel to the patient's relaxed skin tension lines.   The subcutaneous and dermal layers were then closed with 4-0 Monocryl sutures. The epidermis was then carefully approximated along the length of the wound using 4-0 Monocryl subcuticular sutures.     Estimated blood loss was less than 10 ml for all surgical sites. A sterile pressure dressing was applied and wound care instructions, with a written handout, were given. The patient was discharged from the [facility] alert and ambulatory.    Follow-up for suture removal: Not applicable as only dissolving sutures used    Jean Carlos Peralta MD was immediately available for the entire surgery and was physicially present for the key portions of the procedure.    Dr. Caleb Norman (Mohs micrographic surgery fellow) performed the Mohs micrographic surgery and reconstruction under the direct supervision of Jean Carlos Peralta MD, who was present for the entire micrographic surgery and key portions of the reconstruction, and always immediately available.      [facility] Mohs Surgery Procedure Note    Case #: 4  Date of Service:   Jan 11, 2023  Surgery: Mohs micrographic surgery (MMS)  Staff surgeon: Jean Carlos Peralta MD  Fellow surgeon: Caleb Norman MD  Resident surgeon: Nava Benitez MD  Nurse: Lisa Johnson CMA    Tumor Type: SCC  Location: Left lateral cheek  Derm-Path Accession #: [id number]     Mohs Accession #:   Pre-Op Size: 1.7 cm x 1.9 cm  Final Defect Size: 2.2 cm x 2.2 cm  Number of Mohs stages: 1  Level of Defect: Fat  Local anesthetic: 6 mL 1% lidocaine with epinephrine  Repair Type: Complex linear  Repair Size: 3.5 cm  Suture Material: 4-0 Monocryl; 5-0 fast absorbing gut    Procedure:    Stage I  We discussed the principles of treatment and most likely complications including scarring, bleeding, infection, swelling, pain, crusting, nerve damage, large wound,  incomplete excision, wound dehiscence,  nerve damage, recurrence, and a second procedure may be recommended to obtain the best cosmetic or functional result.    Informed consent was obtained and the patient underwent the procedure as follows:  The patient was placed supine on the operating table.  The cancer was identified, outlined with a marker, and verified by the patient.  The entire surgical field was prepped with chlorhexidine.  The surgical site was anesthetized using lidocaine with epinephrine.    The area of clinically apparent tumor was debulked. The layer of tissue was then surgically excised using a #15 blade and was then transferred onto a specimen sheet maintaining the orientation of the specimen. Hemostasis was obtained using electrocoagulation. The wound site was then covered with a dressing while the tissue samples were processed for examination.    The excised tissue was transported to the Mohs histology laboratory maintaining the tissue orientation.  The tissue specimen was relaxed so that the entire surgical margin was in a a single horizontal plane for sectioning and inked for precise mapping.  A precise reference map was drawn to reflect the  sectioning of the specimen, colored inking of the margins, and orientation on the patient.  The tissue was processed using horizontal sectioning of the base and continuous peripheral margins.  The histopathologic sections were reviewed in conjunction with the reference map.    Total blocks: 1  Total slides: 4    There were no cancer cells visualized on examination, therefore Mohs surgery was complete.    REPAIR:     Due to one or more of the following factors, complex linear closure was required/indicated: Extensive undermining (equal to or greater than the maximum perpendicular width of the defect), exposure of underlying structure (bone, cartilage, tendon, named neurovascular), free margin involvement (helical rim, vermillion border, nostril rim), and/or placement of retention sutures.    After the excision of the tumor, the area was extensively and carefully undermined using tissue scissors. Hemostasis was obtained with spot electrocautery and ligation of vessels where necessary. Initial deep plication sutures of 4-0 Monocryl sutures were placed in the deep, subcutaneous, and fascial planes to appose the lateral margins. The subcutaneous and dermal layers were then closed with 4-0 Monocryl sutures. The epidermis was then carefully approximated along the length of the wound using 5-0 fast absorbing gut simple running sutures.     Estimated blood loss was less than 10 ml for all surgical sites. A sterile pressure dressing was applied and wound care instructions, with a written handout, were given. The patient was discharged from the [facility] alert and ambulatory.    Jean Carlos Peralta MD staffed the patient and was present for the key portions of the above procedure.     Dr. Caleb Norman (Mohs micrographic surgery fellow) performed the Mohs micrographic surgery and reconstruction under the direct supervision of Jean Carlos Peralta MD, who was present for the entire micrographic surgery and key portions of the  reconstruction, and always immediately available.    Caleb Norman MD  Dermatology, PGY-5  Mohs surgery fellow    Scribe Disclosure:  I, Hussein Lazo, am serving as a scribe to document services personally performed by Jean Carlos Peralta MD based on data collection and the provider's statements to me.       Attending attestation:  I was present for key elements of the procedure and immediately available for all other portions of the procedure.  I have reviewed the note and edited it as necessary.    Jean Carlos Peralta M.D.  Professor  Director of Dermatologic Surgery  Department of Dermatology  [facility]    Dermatology Surgery Clinic  [facility]  [address] [place], [place] [id number]

## 2023-01-11 NOTE — NURSING NOTE
Chief Complaint   Patient presents with     Derm Problem     Patient is here today for Mohs regarding melanoma on mid paraspinal back.     Mariana GOMEZ RN

## 2023-01-12 ENCOUNTER — TELEPHONE (OUTPATIENT)
Dept: DERMATOLOGY | Facility: CLINIC | Age: 63
End: 2023-01-12

## 2023-01-12 NOTE — CONFIDENTIAL NOTE
Follow up call completed following Mohs procedure with Dr. Peralta    Questions asked:     Are you having pain? no  What are you taking for pain? ibuprofen  Have you been icing the area? no  Have you noticed increasing bleeding? no  Do you have any concerns/questions? no    Discussed contacting the clinic for the following and provided phone number ([phone]):     bleeding that will not stop after applying pressure and ice.    pain that is not controlled with Tylenol    signs or symptoms of an infection such as:  o Fever over 100 degrees Fahrenheit  o Redness, warmth or foul-smelling drainage from the wound  o Increasing pain     Patient concerns/questions answered at time of call.

## 2023-01-17 LAB
PATH REPORT.COMMENTS IMP SPEC: NORMAL
PATH REPORT.COMMENTS IMP SPEC: NORMAL
PATH REPORT.FINAL DX SPEC: NORMAL
PATH REPORT.GROSS SPEC: NORMAL
PATH REPORT.MICROSCOPIC SPEC OTHER STN: NORMAL
PATH REPORT.RELEVANT HX SPEC: NORMAL

## 2023-01-25 ENCOUNTER — ALLIED HEALTH/NURSE VISIT (OUTPATIENT)
Dept: DERMATOLOGY | Facility: CLINIC | Age: 63
End: 2023-01-25
Payer: COMMERCIAL

## 2023-01-25 DIAGNOSIS — Z48.02 VISIT FOR SUTURE REMOVAL: Primary | ICD-10-CM

## 2023-01-25 PROCEDURE — 99207 PR NO CHARGE NURSE ONLY: CPT | Performed by: DERMATOLOGY

## 2023-01-25 ASSESSMENT — PAIN SCALES - GENERAL: PAINLEVEL: NO PAIN (0)

## 2023-01-25 NOTE — NURSING NOTE
Cornelius Ruiz comes into clinic today at the request of Dr. Peralta Ordering Provider for     This service provided today was under the supervising provider of the day Dr. Peralta, who was available if needed.    Lisa Johnson CMA

## 2023-02-15 ENCOUNTER — TELEPHONE (OUTPATIENT)
Dept: DERMATOLOGY | Facility: CLINIC | Age: 63
End: 2023-02-15

## 2023-02-15 NOTE — TELEPHONE ENCOUNTER
[facility] Call Center    Phone Message    May a detailed message be left on voicemail: yes     Reason for Call: Other: Pt is ill and unable to make today's appointment. Please call to reschedule for next soonest time. [phone]     Action Taken: Message routed to:  [facility] ([facility] Derm    Travel Screening: Not Applicable

## 2023-02-15 NOTE — TELEPHONE ENCOUNTER
Left pt a detailed vm to call back and rescheduled.     Areli Dailey, Procedure  2/15/2023 8:47 AM

## 2023-03-16 ENCOUNTER — OFFICE VISIT (OUTPATIENT)
Dept: DERMATOLOGY | Facility: CLINIC | Age: 63
End: 2023-03-16
Attending: DERMATOLOGY
Payer: COMMERCIAL

## 2023-03-16 VITALS — SYSTOLIC BLOOD PRESSURE: 170 MMHG | HEART RATE: 101 BPM | DIASTOLIC BLOOD PRESSURE: 113 MMHG

## 2023-03-16 DIAGNOSIS — D04.5 SQUAMOUS CELL CARCINOMA IN SITU (SCCIS) OF SKIN OF TORSO: ICD-10-CM

## 2023-03-16 DIAGNOSIS — Z85.828 HISTORY OF SKIN CANCER: Primary | ICD-10-CM

## 2023-03-16 DIAGNOSIS — C44.519 BASAL CELL CARCINOMA (BCC) OF SKIN OF OTHER PART OF TORSO: ICD-10-CM

## 2023-03-16 PROCEDURE — 12032 INTMD RPR S/A/T/EXT 2.6-7.5: CPT | Performed by: DERMATOLOGY

## 2023-03-16 PROCEDURE — 17313 MOHS 1 STAGE T/A/L: CPT | Performed by: DERMATOLOGY

## 2023-03-16 PROCEDURE — 13101 CMPLX RPR TRUNK 2.6-7.5 CM: CPT | Performed by: DERMATOLOGY

## 2023-03-16 ASSESSMENT — PAIN SCALES - GENERAL: PAINLEVEL: NO PAIN (0)

## 2023-03-16 NOTE — PROGRESS NOTES
[facility] Mohs Surgery Procedure Note    Case #: 1  Date of Service:  Mar 16, 2023  Surgery: Mohs micrographic surgery (MMS)  Staff surgeon: Jean Carlos Peralta MD  Fellow surgeon: None  Resident surgeon: None  Nurse: Lisa Johnson CMA    Tumor Type: SCCis  Location: right mid clavicle  Derm-Path Accession #: [id number]    Mohs Accession #:   Pre-Op Size: 1.0 cm x 1.8 cm  Final Defect Size: 2.2 cm x 3.0 cm  Number of Mohs stages: 1  Level of Defect: Fat  Local anesthetic: 9 mL 1% lidocaine with epinephrine  Repair Type: Complex repair  Repair Size: 4.3 cm  Suture Material: 4-0 monocryl    Procedure:    Stage I  We discussed the principles of treatment and most likely complications including scarring, bleeding, infection, swelling, pain, crusting, nerve damage, large wound,  incomplete excision, wound dehiscence,  nerve damage, recurrence, and a second procedure may be recommended to obtain the best cosmetic or functional result.    Informed consent was obtained and the patient underwent the procedure as follows:  The patient was placed supine on the operating table.  The cancer was identified, outlined with a marker, and verified by the patient.  The entire surgical field was prepped with chlorhexidine.  The surgical site was anesthetized using lidocaine with epinephrine.    The area of clinically apparent tumor was debulked. The layer of tissue was then surgically excised using a #15 blade and was then transferred onto a specimen sheet maintaining the orientation of the specimen. Hemostasis was obtained using electrocoagulation. The wound site was then covered with a dressing while the tissue samples were processed for examination.    The excised tissue was transported to the Mohs histology laboratory maintaining the tissue orientation.  The tissue specimen was relaxed so that the entire surgical margin was in a a single horizontal plane for sectioning and inked for precise mapping.  A precise  reference map was drawn to reflect the sectioning of the specimen, colored inking of the margins, and orientation on the patient.  The tissue was processed using horizontal sectioning of the base and continuous peripheral margins.  The histopathologic sections were reviewed in conjunction with the reference map.    Total blocks: 1  Total slides: 3    There were no cancer cells visualized on examination, therefore Mohs surgery was complete.    REPAIR:     Due to one or more of the following factors, complex linear closure was required/indicated: Extensive undermining (equal to or greater than the maximum perpendicular width of the defect), exposure of underlying structure (bone, cartilage, tendon, named neurovascular), free margin involvement (helical rim, vermillion border, nostril rim), and/or placement of retention sutures.    After the excision of the tumor, the area was extensively and carefully undermined using tissue scissors. Hemostasis was obtained with spot electrocautery and ligation of vessels where necessary. Initial deep plication sutures of 4-0 Monocryl sutures were placed in the deep, subcutaneous, and fascial planes to appose the lateral margins. The subcutaneous and dermal layers were then closed with 4-0 Monocryl sutures. The epidermis was then carefully approximated along the length of the wound using 4-0 Monocryl subcuticular sutures.     Estimated blood loss was less than 10 ml for all surgical sites. A sterile pressure dressing was applied and wound care instructions, with a written handout, were given. The patient was [facility] from the [facility] alert and ambulatory.    Jean Carlos Peralta MD staffed the patient and was present for the key portions of the above procedure.     Dr. Jean Carlos Peralta (Mohs micrographic surgery fellow) performed the Mohs micrographic surgery and reconstruction.    Case #: 2  Date of Service:  Mar 16, 2023  Surgery: Mohs micrographic surgery (MMS)  Staff  surgeon: Jean Carlos Peralta MD  Fellow surgeon: None  Resident surgeon: None  Nurse: Lisa Johnson CMA    Tumor Type: BCC  Location: left scapular back  Derm-Path Accession #: [id number]    Mohs Accession #:   Pre-Op Size: 2.0 cm x 1.8 cm  Final Defect Size: 4.5 cm x 3.0 cm  Number of Mohs stages: 1  Level of Defect: Fat  Local anesthetic: 12 mL 1% lidocaine with epinephrine  Repair Type: Intermediate repair  Repair Size: 6.3 cm  Suture Material: 3-0 Vicryl; 4-0 Monocryl    Procedure:    Stage I  We discussed the principles of treatment and most likely complications including scarring, bleeding, infection, swelling, pain, crusting, nerve damage, large wound,  incomplete excision, wound dehiscence,  nerve damage, recurrence, and a second procedure may be recommended to obtain the best cosmetic or functional result.    Informed consent was obtained and the patient underwent the procedure as follows:  The patient was placed supine on the operating table.  The cancer was identified, outlined with a marker, and verified by the patient.  The entire surgical field was prepped with chlorhexidine.  The surgical site was anesthetized using lidocaine with epinephrine.    The area of clinically apparent tumor was debulked. The layer of tissue was then surgically excised using a #15 blade and was then transferred onto a specimen sheet maintaining the orientation of the specimen. Hemostasis was obtained using electrocoagulation. The wound site was then covered with a dressing while the tissue samples were processed for examination.    The excised tissue was transported to the [facility] histology laboratory maintaining the tissue orientation.  The tissue specimen was relaxed so that the entire surgical margin was in a a single horizontal plane for sectioning and inked for precise mapping.  A precise reference map was drawn to reflect the sectioning of the specimen, colored inking of the margins, and orientation on the patient.  The  tissue was processed using horizontal sectioning of the base and continuous peripheral margins.  The histopathologic sections were reviewed in conjunction with the reference map.    Total blocks: 2  Total slides: 7    There were no cancer cells visualized on examination, therefore Mohs surgery was complete.    REPAIR: An intermediate layered linear closure was selected as the procedure which would maximally preserve both function and cosmesis.    After the excision of the tumor, the area was carefully undermined. Hemostasis was obtained with electrocoagulation.  Closure was oriented so that the wound was parallel to the patient's relaxed skin tension lines.   The subcutaneous and dermal layers were then closed with 4-0 Vicryl sutures. The epidermis was then carefully approximated along the length of the wound using 4-0 Monocryl subcuticular sutures.     Estimated blood loss was less than 10 ml for all surgical sites. A sterile pressure dressing was applied and wound care instructions, with a written handout, were given. The patient was discharged from the [facility] alert and ambulatory.    Follow-up for suture removal: Not applicable as only dissolving sutures used    Dr. Jean Carlos Peralta performed the Mohs micrographic surgery and reconstruction.    Scribe Disclosure:  I, Mikhail Jacques, am serving as a scribe to document services personally performed by Jean Carlos Peralta MD based on data collection and the provider's statements to me.     Attending attestation:  I personally performed the entire procedure.  I have reviewed the note and edited it as necessary, and agree with its contents.    Jean Carlos Peralta M.D.  Professor  Director of Dermatologic Surgery  Department of Dermatology  [facility]    Dermatology Surgery Clinic  [facility] [place] [address]

## 2023-03-16 NOTE — NURSING NOTE
Chief Complaint   Patient presents with     Derm Problem     Patient is here today for Mohs surgery regarding back and neck.     Mariana GOMEZ RN

## 2023-03-16 NOTE — LETTER
3/16/2023       RE: Cornelius Ruiz  [address]  [place] [address]     Dear Colleague,    Thank you for referring your patient, Cornelius Ruiz, to the [facility] [place] [facility]. Please see a copy of my visit note below.    [facility] Mohs Surgery Procedure Note    Case #: 1  Date of Service:  Mar 16, 2023  Surgery: Mohs micrographic surgery (MMS)  Staff surgeon: Jean Carlos Peralta MD  Fellow surgeon: None  Resident surgeon: None  Nurse: Lisa Johnson CMA    Tumor Type: SCCis  Location: right mid clavicle  Derm-Path Accession #: [id number]    Mohs Accession #:   Pre-Op Size: 1.0 cm x 1.8 cm  Final Defect Size: 2.2 cm x 3.0 cm  Number of Mohs stages: 1  Level of Defect: Fat  Local anesthetic: 9 mL 1% lidocaine with epinephrine  Repair Type: Complex repair  Repair Size: 4.3 cm  Suture Material: 4-0 monocryl    Procedure:    Stage I  We discussed the principles of treatment and most likely complications including scarring, bleeding, infection, swelling, pain, crusting, nerve damage, large wound,  incomplete excision, wound dehiscence,  nerve damage, recurrence, and a second procedure may be recommended to obtain the best cosmetic or functional result.    Informed consent was obtained and the patient underwent the procedure as follows:  The patient was placed supine on the operating table.  The cancer was identified, outlined with a marker, and verified by the patient.  The entire surgical field was prepped with chlorhexidine.  The surgical site was anesthetized using lidocaine with epinephrine.    The area of clinically apparent tumor was debulked. The layer of tissue was then surgically excised using a #15 blade and was then transferred onto a specimen sheet maintaining the orientation of the specimen. Hemostasis was obtained using electrocoagulation. The wound site was then covered with a  dressing while the tissue samples were processed for examination.    The excised tissue was transported to the Mohs histology laboratory maintaining the tissue orientation.  The tissue specimen was relaxed so that the entire surgical margin was in a a single horizontal plane for sectioning and inked for precise mapping.  A precise reference map was drawn to reflect the sectioning of the specimen, colored inking of the margins, and orientation on the patient.  The tissue was processed using horizontal sectioning of the base and continuous peripheral margins.  The histopathologic sections were reviewed in conjunction with the reference map.    Total blocks: 1  Total slides: 3    There were no cancer cells visualized on examination, therefore Mohs surgery was complete.    REPAIR:     Due to one or more of the following factors, complex linear closure was required/indicated: Extensive undermining (equal to or greater than the maximum perpendicular width of the defect), exposure of underlying structure (bone, cartilage, tendon, named neurovascular), free margin involvement (helical rim, vermillion border, nostril rim), and/or placement of retention sutures.    After the excision of the tumor, the area was extensively and carefully undermined using tissue scissors. Hemostasis was obtained with spot electrocautery and ligation of vessels where necessary. Initial deep plication sutures of 4-0 Monocryl sutures were placed in the deep, subcutaneous, and fascial planes to appose the lateral margins. The subcutaneous and dermal layers were then closed with 4-0 Monocryl sutures. The epidermis was then carefully approximated along the length of the wound using 4-0 Monocryl subcuticular sutures.     Estimated blood loss was less than 10 ml for all surgical sites. A sterile pressure dressing was applied and wound care instructions, with a written handout, were given. The patient was discharged from the [facility]  alert and ambulatory.    Jean Carlos Peralta MD staffed the patient and was present for the key portions of the above procedure.     Dr. Jean Carlos Peralta (Mohs micrographic surgery fellow) performed the Mohs micrographic surgery and reconstruction.    Case #: 2  Date of Service:  Mar 16, 2023  Surgery: Mohs micrographic surgery (MMS)  Staff surgeon: Jean Carlos Peralta MD  Fellow surgeon: None  Resident surgeon: None  Nurse: Lisa Johnson CMA    Tumor Type: BCC  Location: left scapular back  Derm-Path Accession #: [id number]    Mohs Accession #:   Pre-Op Size: 2.0 cm x 1.8 cm  Final Defect Size: 4.5 cm x 3.0 cm  Number of Mohs stages: 1  Level of Defect: Fat  Local anesthetic: 12 mL 1% lidocaine with epinephrine  Repair Type: Intermediate repair  Repair Size: 6.3 cm  Suture Material: 3-0 Vicryl; 4-0 Monocryl    Procedure:    Stage I  We discussed the principles of treatment and most likely complications including scarring, bleeding, infection, swelling, pain, crusting, nerve damage, large wound,  incomplete excision, wound dehiscence,  nerve damage, recurrence, and a second procedure may be recommended to obtain the best cosmetic or functional result.    Informed consent was obtained and the patient underwent the procedure as follows:  The patient was placed supine on the operating table.  The cancer was identified, outlined with a marker, and verified by the patient.  The entire surgical field was prepped with chlorhexidine.  The surgical site was anesthetized using lidocaine with epinephrine.    The area of clinically apparent tumor was debulked. The layer of tissue was then surgically excised using a #15 blade and was then transferred onto a specimen sheet maintaining the orientation of the specimen. Hemostasis was obtained using electrocoagulation. The wound site was then covered with a dressing while the tissue samples were processed for examination.    The excised tissue was transported to the [facility] histology laboratory  maintaining the tissue orientation.  The tissue specimen was relaxed so that the entire surgical margin was in a a single horizontal plane for sectioning and inked for precise mapping.  A precise reference map was drawn to reflect the sectioning of the specimen, colored inking of the margins, and orientation on the patient.  The tissue was processed using horizontal sectioning of the base and continuous peripheral margins.  The histopathologic sections were reviewed in conjunction with the reference map.    Total blocks: 2  Total slides: 7    There were no cancer cells visualized on examination, therefore Mohs surgery was complete.    REPAIR: An intermediate layered linear closure was selected as the procedure which would maximally preserve both function and cosmesis.    After the excision of the tumor, the area was carefully undermined. Hemostasis was obtained with electrocoagulation.  Closure was oriented so that the wound was parallel to the patient's relaxed skin tension lines.   The subcutaneous and dermal layers were then closed with 4-0 Vicryl sutures. The epidermis was then carefully approximated along the length of the wound using 4-0 Monocryl subcuticular sutures.     Estimated blood loss was less than 10 ml for all surgical sites. A sterile pressure dressing was applied and wound care instructions, with a written handout, were given. The patient was discharged from the [facility] alert and ambulatory.    Follow-up for suture removal: Not applicable as only dissolving sutures used    Dr. Jean Carlos Peralta performed the Mohs micrographic surgery and reconstruction.    Scribe Disclosure:  I, Mikhail Jacques, am serving as a scribe to document services personally performed by Jean Carlos Peralta MD based on data collection and the provider's statements to me.     Attending attestation:  I personally performed the entire procedure.  I have reviewed the note and edited it as necessary, and agree with its  contents.    Jean Carlos Peralta M.D.  Professor  Director of Dermatologic Surgery  Department of Dermatology  [facility]    Dermatology Surgery Clinic  [facility] [place] [address]        Again, thank you for allowing me to participate in the care of your patient.      Sincerely,    Jean Carlos Peralta MD

## 2023-03-16 NOTE — LETTER
Date:March 24, 2023      Provider requested that no letter be sent. Do not send.       [facility]

## 2023-03-17 ENCOUNTER — TELEPHONE (OUTPATIENT)
Dept: DERMATOLOGY | Facility: CLINIC | Age: 63
End: 2023-03-17
Payer: COMMERCIAL

## 2023-03-17 NOTE — CONFIDENTIAL NOTE
Follow up call completed following Mohs procedure with Dr. Peralta       Are you having pain? yes  Are you taking pain medication? Tylenol  Are you applying ice?  no  Have you had any noticeable bleeding through the bandage? no   Do you have any other concerns? no         Please call [phone] option [phone] if you have any questions or concerns.

## 2024-02-12 ENCOUNTER — TRANSFERRED RECORDS (OUTPATIENT)
Dept: MULTI SPECIALTY CLINIC | Facility: CLINIC | Age: 64
End: 2024-02-12

## 2024-02-24 DIAGNOSIS — Z85.828 HISTORY OF NONMELANOMA SKIN CANCER: ICD-10-CM

## 2024-02-29 RX ORDER — NIACINAMIDE 500 MG
500 TABLET ORAL 2 TIMES DAILY WITH MEALS
Qty: 180 TABLET | Refills: 1 | Status: SHIPPED | OUTPATIENT
Start: 2024-02-29

## 2024-02-29 NOTE — TELEPHONE ENCOUNTER
niacinamide 500 MG tablet   120 tablet 11 11/18/2022       Last Office Visit : 3-  Future Office visit:  4-

## 2024-04-16 ENCOUNTER — OFFICE VISIT (OUTPATIENT)
Dept: DERMATOLOGY | Facility: CLINIC | Age: 64
End: 2024-04-16
Payer: COMMERCIAL

## 2024-04-16 DIAGNOSIS — L57.0 ACTINIC KERATOSIS: ICD-10-CM

## 2024-04-16 DIAGNOSIS — Z86.007 HISTORY OF SQUAMOUS CELL CARCINOMA IN SITU: ICD-10-CM

## 2024-04-16 DIAGNOSIS — Z85.828 HISTORY OF BASAL CELL CARCINOMA: ICD-10-CM

## 2024-04-16 DIAGNOSIS — Z85.820 HISTORY OF MALIGNANT MELANOMA OF SKIN: ICD-10-CM

## 2024-04-16 DIAGNOSIS — Z85.828 HX OF SQUAMOUS CELL CARCINOMA OF SKIN: ICD-10-CM

## 2024-04-16 DIAGNOSIS — C44.519 BASAL CELL CARCINOMA (BCC) OF BACK: ICD-10-CM

## 2024-04-16 DIAGNOSIS — L57.8 ACTINIC SKIN DAMAGE: Primary | ICD-10-CM

## 2024-04-16 PROCEDURE — 99214 OFFICE O/P EST MOD 30 MIN: CPT | Mod: 25 | Performed by: STUDENT IN AN ORGANIZED HEALTH CARE EDUCATION/TRAINING PROGRAM

## 2024-04-16 PROCEDURE — 17004 DESTROY PREMAL LESIONS 15/>: CPT | Performed by: STUDENT IN AN ORGANIZED HEALTH CARE EDUCATION/TRAINING PROGRAM

## 2024-04-16 RX ORDER — IMIQUIMOD 12.5 MG/.25G
CREAM TOPICAL
Qty: 24 PACKET | Refills: 3 | Status: SHIPPED | OUTPATIENT
Start: 2024-04-16

## 2024-04-16 RX ORDER — FLUOROURACIL 50 MG/G
CREAM TOPICAL 2 TIMES DAILY
Qty: 40 G | Refills: 3 | Status: SHIPPED | OUTPATIENT
Start: 2024-04-16

## 2024-04-16 NOTE — NURSING NOTE
Dermatology Rooming Note    Cornelius Ruiz's goals for this visit include:   Chief Complaint   Patient presents with    Derm Problem     MARIELLA Win, EMT-B

## 2024-04-16 NOTE — PATIENT INSTRUCTIONS
Apply efudex twice daily for 2 weeks as tolerated to forehead and temples    Apply imiquimod once daily to BCC on back       5-Fluorouracil Topical Cream (Efudex , Carac , Fluoroplex )    What is the cream used for?  Sun damage and skin pre-cancers (actinic keratoses)  Specific types of skin cancers that are only in the top layer of the skin    How do I use this cream?  Use cream on skin as instructed.  Treatment usually lasts from two to four weeks for actinic keratoses; somewhat longer for specific skin cancers.  Do not use this cream inside the eyes, nose or mouth.  Apply a thin layer to affected area, gently rub in.  Wash your hands after using the cream.  You can use moisturizer and/or sunscreen two hours after you apply the cream.  Use a sunscreen with an SPF of at least 30 every day and wear protective clothing.    What should I avoid while using this cream?  Do not use if you are or may become pregnant.  Stay out of the sun as much as you can; do not use sunlamps or tanning beds.   Do not cover treated areas with a dressing.  Keep the cream away from children and pets.  It will harm them if eaten.     What can I expect when using this cream?  Skin irritation during and for up to two or more weeks after treatment is finished  Redness  Dryness  Burning  Open areas  Mild swelling  Regular use of a moisturizer will help with healing and improve irritation.  Do not use antibiotic ointments.    Who should I call with questions?  [facility]: [phone]   [facility]: [phone]  For urgent needs outside of business hours call the [facility] at [phone] and ask for the dermatology resident on call      Please start Aldara daily for 6 weeks. It comes in little packets - you only need to use a small amount from each packet to apply thin layer to skin cancer site. You can use a q-tip to get the small amount out of the packet. We expect  irritation to occur but if irritation is too painful, you can stop early. We should check this spot about 2-3 months after treatment to make sure it responded.    Aldara Treatment  Today you are being prescribed Imiquimod (Aldara), a topical cream.  The medication is working to eliminate the unhealthy cells. Even though this treatment may be unattractive and somewhat uncomfortable, this is actually a GOOD sign, because it means that the medicine is successfully triggering an immune response to attack the treatment target. If the areas become uncomfortable to an intolerable degree, it is ok to pause treatment until the area is comfortable and then resume treatment.    You may experience some mild discomfort while being treated.  You will want to stop any other creams such as glycolic acid products, retin A, Tazorac, etc. to the area. You may use bland makeup/cover-up as long as it doesn't sting or cause you discomfort.  Apply the cream as instructed by your physician. Use a cotton-tipped applicator, or use gloves if applying it with your fingertips. If applied with unprotected fingertips, it is important to wash your hands well after you apply this medicine.   Keep medication away from pets.  Avoid excessive sun exposure to the treated area.  You may use moisturizing creams over bothersome areas such as Vanicream or Cetaphil cream if the reaction becomes too bothersome. Please, call the clinic if this occurs.     Potential Side Effects  Your treated areas may be unsightly during therapy.  This will improve slowly following the discontinuation of therapy.   During the first week of application, mild inflammation may occur.   During the following weeks, redness, and swelling may occur with some crusting and burning.   Lesions resolve as the skin exfoliates.   Over 1 to 2 weeks, new skin grows into the treatment area.   Specific side effects that usually do not require medical attention (report to your doctor or health  care professional if they continue or are bothersome) include:   Red or dark-colored skin   Mild erosion (loss of upper layer of skin)   Mild eye irritation including burning, itching, sensitivity, stinging, or watering   Increased sensitivity of the skin to sun and ultraviolet light   Pain and burning of the affected area   Dryness, scaling or swelling of the affected area   Skin rash, itching of the affected area   Tenderness   Who should I call with questions?  [facility]: [phone]  [facility]: [phone]  For urgent needs outside of business hours call the [facility] at [phone] and ask for the dermatology resident on call

## 2024-04-16 NOTE — PROGRESS NOTES
[facility] Dermatology Note    Encounter Date: Apr 16, 2024    Dermatology Problem List:  # LTM - nasal tip - previously several MMS procedures, but patient noting some scaling and irritation an occasional bleeding - will treat with efudex/calcipotriene, but if persists consider bx for recurrence  1. Melanoma in situ, left back, s/p bx 4/1/22, s/p excision  2. NMSC  - niacinamide 500 mg po BID  - SCC, right sideburn, s/p bx 4/1/22, s/p MMS 4/26/22  - SCC, right lateral cheek, s/p bx 4/1/22, s/p MMS 4/26/22  - BCC, right upper chest, s/p bx 4/1/22, s/p excision 05/2022  - BCC, left upper chest, s/p bx 4/1/22, s/p excision 05/2022  - 11/18/22 SCC R anterior shoulder  - 11/18/22 nBCC R mid cheek s/p mohs  - 11/18/22 SCC L lateral cheek s/p mohs  - 11/18/22 MM Mid paraspinal back 0.5mm BD s/p WLE  - 11/18/22 SCCIS L scapular back s/p mohs  - 11/18/22 SCCIS R medial clavicle  s/p mohs    3. 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 Cellulitis, chest  - Doxycycline 100 mg BID x10 days initiated 5/25/2022  5. Diffuse actinic damage   - Efudex/calcipotriene cream (initiaited to face 11/18/2022)  - previously completed efudex alone on the chest with moderate to good response  - future: consider PDT    Major PMHx  -   ______________________________________    Impression/Plan:  Cornelius was seen today for derm problem.    Diagnoses and all orders for this visit:    Actinic skin damage  - Reviewed the compounding benefits of incremental changes to sun protective clothing behaviors including increased frequency of sunscreen and sun protective clothing like broad brimmed hats and longsleeved UPF containing clothing    Actinic keratosis  -     fluorouracil (EFUDEX) 5 % external cream; Apply topically 2 times daily  -     Adult Dermatology Clinic Follow-Up Order (Blank); Future  - efudex BID to areas on forehead and temples   - LN2 x22 on back and face     History of basal cell carcinoma (Continuing focal point  for medical care services related to serious/complex condition)  Hx of squamous cell carcinoma of skin  History of squamous cell carcinoma in situ  History of malignant melanoma of skin  - No obvious evidence of recurrence at site of previous malignancy  -Continue niacinamide 500 mg twice daily    Basal cell carcinoma (BCC) of back  -     imiquimod (ALDARA) 5 % external cream; Apply daily to area of BCC on R back  -Empirically diagnosed, defers biopsy  - smBCC apply imiquimod daily x6 weeks        Cryotherapy procedure note: After verbal consent and discussion of risks and benefits including but no limited to dyspigmentation/scar, blister, and pain, 22 aks on face and forehead was(were) treated with 1-2mm freeze border for 2 cycles with liquid nitrogen. Post cryotherapy instructions were provided.     Follow-up in 3 mo.       Staff Involved:  Staff Only    Paulino Peralta MD   of Dermatology  Department of Dermatology  [facility] [employer]      CC:   Chief Complaint   Patient presents with    Derm Problem     FBSE       History of Present Illness:  Mr. Cornelius Ruiz is a 63 year old male who presents as a return patient.    Presents today last seen about a year ago.  He has a history of multiple skin cancers well as photodamage and actinic keratoses.  He has some sensitive scaly spots that he like examined and potentially frozen.  He continues to take niacinamide 500 mg twice daily    Labs:      Physical exam:  Vitals: There were no vitals taken for this visit.  GEN: well developed, well-nourished, in no acute distress, in a pleasant mood.     SKIN: Cannon phototype 1  - Full skin, which includes the head/face, both arms, chest, back, abdomen,both legs, genitalia and/or groin buttocks, digits and/or nails, was examined.  - Flat brown macules and patches in a sun exposed areas on face and extremities  - gritty pink papules on back and face  - No obvious evidence of  recurrence at site of previous malignancy  -Deep pink patch left back  - No other lesions of concern on areas examined.     Past Medical History:   History reviewed. No pertinent past medical history.  History reviewed. No pertinent surgical history.    Social History:   reports that he has never smoked. He has never used smokeless tobacco.    Family History:  History reviewed. No pertinent family history.    Medications:  Current Outpatient Medications   Medication Sig Dispense Refill    aspirin 81 MG EC tablet Take 81 mg by mouth daily      calcipotriene (DOVONOX) 0.005 % external cream Mix together with fluorouracil, 1:1 ratio and apply to the face BID x 4 days. Wash hands after application. 120 g 1    cephALEXin (KEFLEX) 500 MG capsule Take 1 capsule (500 mg) by mouth 3 times daily 30 capsule 0    ciprofloxacin (CIPRO) 500 MG/5ML (10%) suspension Take by mouth 2 times daily      fluorouracil (EFUDEX) 5 % external cream Apply topically 2 times daily 40 g 3    fluorouracil (EFUDEX) 5 % external cream Mix together with calcipotriene, 1:1 ratio and apply to the face BID x 4 days. Wash hands after application. 40 g 1    imiquimod (ALDARA) 5 % external cream Apply daily to area of BCC on R back 24 packet 3    insulin glargine (LANTUS VIAL) 100 UNIT/ML vial Inject Subcutaneous At Bedtime      niacinamide 500 MG tablet Take 1 tablet (500 mg) by mouth 2 times daily (with meals) 180 tablet 1    sildenafil (VIAGRA) 50 MG tablet Take 50 mg by mouth daily as needed       No Known Allergies               Additional Anesthesia Volume In Cc: 6

## 2024-04-16 NOTE — LETTER
4/16/2024       RE: Cornelius Ruiz  [address]  [place] [address]     Dear Colleague,    Thank you for referring your patient, Cornelius Ruiz, to the [facility]MAT[facility] [place] at [facility]. Please see a copy of my visit note below.    Dermatology Rooming Note    Cornelius RUELAS Ruiz's goals for this visit include:   Chief Complaint   Patient presents with    Derm Problem     MARIELLA Win, EMT-B      [facility] Dermatology Note    Encounter Date: Apr 16, 2024    Dermatology Problem List:  # LTM - nasal tip - previously several MMS procedures, but patient noting some scaling and irritation an occasional bleeding - will treat with efudex/calcipotriene, but if persists consider bx for recurrence  1. Melanoma in situ, left back, s/p bx 4/1/22, s/p excision  2. NMSC  - niacinamide 500 mg po BID  - SCC, right sideburn, s/p bx 4/1/22, s/p MMS 4/26/22  - SCC, right lateral cheek, s/p bx 4/1/22, s/p MMS 4/26/22  - BCC, right upper chest, s/p bx 4/1/22, s/p excision 05/2022  - BCC, left upper chest, s/p bx 4/1/22, s/p excision 05/2022  - 11/18/22 SCC R anterior shoulder  - 11/18/22 nBCC R mid cheek s/p mohs  - 11/18/22 SCC L lateral cheek s/p mohs  - 11/18/22 MM Mid paraspinal back 0.5mm BD s/p WLE  - 11/18/22 SCCIS L scapular back s/p mohs  - 11/18/22 SCCIS R medial clavicle  s/p mohs    3. 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 Cellulitis, chest  - Doxycycline 100 mg BID x10 days initiated 5/25/2022  5. Diffuse actinic damage   - Efudex/calcipotriene cream (initiaited to face 11/18/2022)  - previously completed efudex alone on the chest with moderate to good response  - future: consider PDT    Major PMHx  -   ______________________________________    Impression/Plan:  Cornelius was seen today for derm problem.    Diagnoses and all orders for this visit:    Actinic skin damage  - Reviewed the compounding benefits of  incremental changes to sun protective clothing behaviors including increased frequency of sunscreen and sun protective clothing like broad brimmed hats and longsleeved UPF containing clothing    Actinic keratosis  -     fluorouracil (EFUDEX) 5 % external cream; Apply topically 2 times daily  -     Adult Dermatology Clinic Follow-Up Order (Blank); Future  - efudex BID to areas on forehead and temples   - LN2 x22 on back and face     History of basal cell carcinoma (Continuing focal point for medical care services related to serious/complex condition)  Hx of squamous cell carcinoma of skin  History of squamous cell carcinoma in situ  History of malignant melanoma of skin  - No obvious evidence of recurrence at site of previous malignancy  -Continue niacinamide 500 mg twice daily    Basal cell carcinoma (BCC) of back  -     imiquimod (ALDARA) 5 % external cream; Apply daily to area of BCC on R back  -Empirically diagnosed, defers biopsy  - smBCC apply imiquimod daily x6 weeks        Cryotherapy procedure note: After verbal consent and discussion of risks and benefits including but no limited to dyspigmentation/scar, blister, and pain, 22 aks on face and forehead was(were) treated with 1-2mm freeze border for 2 cycles with liquid nitrogen. Post cryotherapy instructions were provided.     Follow-up in 3 mo.       Staff Involved:  Staff Only    Paulino Peralta MD   of Dermatology  Department of Dermatology  [facility] [employer]      CC:   Chief Complaint   Patient presents with    Derm Problem     FBSE       History of Present Illness:  Mr. Cornelius Ruiz is a 63 year old male who presents as a return patient.    Presents today last seen about a year ago.  He has a history of multiple skin cancers well as photodamage and actinic keratoses.  He has some sensitive scaly spots that he like examined and potentially frozen.  He continues to take niacinamide 500 mg twice  daily    Labs:      Physical exam:  Vitals: There were no vitals taken for this visit.  GEN: well developed, well-nourished, in no acute distress, in a pleasant mood.     SKIN: Cannon phototype 1  - Full skin, which includes the head/face, both arms, chest, back, abdomen,both legs, genitalia and/or groin buttocks, digits and/or nails, was examined.  - Flat brown macules and patches in a sun exposed areas on face and extremities  - gritty pink papules on back and face  - No obvious evidence of recurrence at site of previous malignancy  -Deep pink patch left back  - No other lesions of concern on areas examined.     Past Medical History:   History reviewed. No pertinent past medical history.  History reviewed. No pertinent surgical history.    Social History:   reports that he has never smoked. He has never used smokeless tobacco.    Family History:  History reviewed. No pertinent family history.    Medications:  Current Outpatient Medications   Medication Sig Dispense Refill    aspirin 81 MG EC tablet Take 81 mg by mouth daily      calcipotriene (DOVONOX) 0.005 % external cream Mix together with fluorouracil, 1:1 ratio and apply to the face BID x 4 days. Wash hands after application. 120 g 1    cephALEXin (KEFLEX) 500 MG capsule Take 1 capsule (500 mg) by mouth 3 times daily 30 capsule 0    ciprofloxacin (CIPRO) 500 MG/5ML (10%) suspension Take by mouth 2 times daily      fluorouracil (EFUDEX) 5 % external cream Apply topically 2 times daily 40 g 3    fluorouracil (EFUDEX) 5 % external cream Mix together with calcipotriene, 1:1 ratio and apply to the face BID x 4 days. Wash hands after application. 40 g 1    imiquimod (ALDARA) 5 % external cream Apply daily to area of BCC on R back 24 packet 3    insulin glargine (LANTUS VIAL) 100 UNIT/ML vial Inject Subcutaneous At Bedtime      niacinamide 500 MG tablet Take 1 tablet (500 mg) by mouth 2 times daily (with meals) 180 tablet 1    sildenafil (VIAGRA) 50 MG tablet  Take 50 mg by mouth daily as needed       No Known Allergies

## 2024-07-29 ENCOUNTER — TELEPHONE (OUTPATIENT)
Dept: DERMATOLOGY | Facility: CLINIC | Age: 64
End: 2024-07-29
Payer: COMMERCIAL

## 2024-07-29 NOTE — TELEPHONE ENCOUNTER
Left Voicemail (1st Attempt) for the patient to call back and schedule the following:    Appointment type: Return dermatology  Provider: Dr. Paulino Peralta  Return date: First available  Specialty phone number: [phone]    Additional Notes:  Patient no showed at last appointment; called to reschedule. Sam

## 2024-07-31 NOTE — TELEPHONE ENCOUNTER
Left Voicemail (2nd Attempt) for the patient to call back and schedule the following:    Appointment type: Return dermatology  Provider: Dr. Paulino Peralta  Return date: First available  Specialty phone number: [phone]

## 2024-12-09 ENCOUNTER — OFFICE VISIT (OUTPATIENT)
Dept: DERMATOLOGY | Facility: CLINIC | Age: 64
End: 2024-12-09
Payer: COMMERCIAL

## 2024-12-09 DIAGNOSIS — Z85.820 HISTORY OF MALIGNANT MELANOMA OF SKIN: Primary | ICD-10-CM

## 2024-12-09 DIAGNOSIS — Z85.828 HX OF SQUAMOUS CELL CARCINOMA OF SKIN: ICD-10-CM

## 2024-12-09 DIAGNOSIS — D49.2 NEOPLASM OF UNSPECIFIED BEHAVIOR OF BONE, SOFT TISSUE, AND SKIN: ICD-10-CM

## 2024-12-09 DIAGNOSIS — L57.0 ACTINIC KERATOSES: ICD-10-CM

## 2024-12-09 DIAGNOSIS — Z86.007 HISTORY OF SQUAMOUS CELL CARCINOMA IN SITU: ICD-10-CM

## 2024-12-09 DIAGNOSIS — L82.0 INFLAMED SEBORRHEIC KERATOSIS: ICD-10-CM

## 2024-12-09 PROCEDURE — 88342 IMHCHEM/IMCYTCHM 1ST ANTB: CPT | Mod: 26 | Performed by: PATHOLOGY

## 2024-12-09 PROCEDURE — 88305 TISSUE EXAM BY PATHOLOGIST: CPT | Mod: 26 | Performed by: PATHOLOGY

## 2024-12-09 PROCEDURE — 88341 IMHCHEM/IMCYTCHM EA ADD ANTB: CPT | Mod: 26 | Performed by: PATHOLOGY

## 2024-12-09 PROCEDURE — 88341 IMHCHEM/IMCYTCHM EA ADD ANTB: CPT | Mod: TC | Performed by: PHYSICIAN ASSISTANT

## 2024-12-09 RX ORDER — CALCIPOTRIENE 50 UG/G
CREAM TOPICAL 2 TIMES DAILY
Qty: 60 G | Refills: 0 | Status: SHIPPED | OUTPATIENT
Start: 2024-12-09

## 2024-12-09 RX ORDER — FLUOROURACIL 50 MG/G
CREAM TOPICAL 2 TIMES DAILY
Qty: 40 G | Refills: 0 | Status: SHIPPED | OUTPATIENT
Start: 2024-12-09

## 2024-12-09 ASSESSMENT — PAIN SCALES - GENERAL: PAINLEVEL_OUTOF10: NO PAIN (1)

## 2024-12-09 NOTE — LETTER
12/9/2024       RE: Cornelius Ruiz  [address]  [place] [address]     Dear Colleague,    Thank you for referring your patient, Cornelius Ruiz, to the [facility]MAT[facility] [place] at [facility]. Please see a copy of my visit note below.    [facility] Dermatology Note  Encounter Date: Dec 9, 2024  Office Visit     Reviewed patients past medical history and pertinent chart review prior to patients visit today.     Dermatology Problem List:  # LTM - nasal tip - resolved 12/9/2024 s/p Efudex- Dovonex    # NUB, right chest, shave biopsy 12/9/2024   # NUB, right upper back, shave biopsy 12/9/2024   # NUB, left upper paraspinal back, shave biopsy 12/9/2024     1. Melanoma in situ, left back, s/p bx 4/1/22, s/p excision  2. NMSC  - niacinamide 500 mg po BID  - SCC, right sideburn, s/p bx 4/1/22, s/p MMS 4/26/22  - SCC, right lateral cheek, s/p bx 4/1/22, s/p MMS 4/26/22  - BCC, right upper chest, s/p bx 4/1/22, s/p excision 05/2022  - BCC, left upper chest, s/p bx 4/1/22, s/p excision 05/2022  - 11/18/22 SCC R anterior shoulder  - 11/18/22 nBCC R mid cheek s/p mohs  - 11/18/22 SCC L lateral cheek s/p mohs  - 11/18/22 MM Mid paraspinal back 0.5mm BD s/p WLE  - 11/18/22 SCCIS L scapular back s/p mohs  - 11/18/22 SCCIS R medial clavicle  s/p mohs  3. 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 Cellulitis, chest  - Doxycycline 100 mg BID x10 days initiated 5/25/2022  5. Diffuse actinic damage   - Efudex/calcipotriene cream (initiaited to face 11/18/2022)  - previously completed efudex alone on the chest with moderate to good response  - future: consider PDT    ____________________________________________    Assessment & Plan:     # Neoplasm of uncertain behavior:  right chest  DDx includes SCC vs BCC. Shave biopsy today.  # Neoplasm of uncertain behavior:  right upper back  DDx includes BCC vs SCC. Shave biopsy today.  # Neoplasm  of uncertain behavior:  left upper paraspinal back  DDx includes atypical nevus vs lichenoid keratosis vs melanoma. Shave biopsy today.    Procedure Note: Biopsy by shave technique  The risks and benefits of the procedure were described to the patient. These include but are not limited to bleeding, infection, scar, incomplete removal, and non-diagnostic biopsy. Verbal informed consent was obtained. The above site(s) was cleansed with an alcohol pad and injected with 1% lidocaine with epinephrine. Once anesthesia was obtained, a biopsy(ies) was performed with Gilette blade. The tissue(s) was placed in a labeled container(s) with formalin and sent to pathology. Hemostasis was achieved with aluminum chloride. Vaseline and a bandage were applied to the wound(s). The patient tolerated the procedure well and was given post biopsy care instructions.    #Actinic keratosis x21  - Due to the diffuse nature of the actinic keratoses involving the face, we discussed options for treatment including liquid nitrogen cryotherapy versus topical calcipotriene 0.005% and fluorouracil 5% cream.  The patient was interested in undergoing topical therapy.  We discussed side effects including redness, pain, and sun sensitivity.  I recommended diligent avoidance of application to the eyes.  The patient had no further questions.    Cryotherapy procedure note, location(s): back x12, chest x7, abdomen x1, right forearm x1 After verbal consent and discussion of risks and benefits including, but not limited to, dyspigmentation/scar, blister, and pain, the lesion(s) was(were) treated with 1-2 mm freeze border for 1-2 cycles with liquid nitrogen. Post cryotherapy instructions were provided.    # Inflamed seborrheic keratoses x1  The benign nature of the skin lesion(s) was discussed with the patient.  Due to the inflamed and irritated nature of the lesions I recommend cryotherapy and the patient was agreeable.      Cryotherapy procedure note,  location(s): right mid back After verbal consent and discussion of risks and benefits including, but not limited to, dyspigmentation/scar, blister, and pain, the lesion(s) was(were) treated with 1-2 mm freeze border for 1-2 cycles with liquid nitrogen. Post cryotherapy instructions were provided.    # Personal history of malignant melanoma  # Personal history nonmelanoma skin cancer  # Multiple nevi, trunk and extremities  # Solar lentigines  - No signs of recurrence. Continued observation recommended.   - Nevi demonstrate no concerning features on dermoscopy. We discussed the importance of self exams at home.   - ABCDEs: Counseled ABCDEs of melanoma: Asymmetry, Border (irregularity), Color (not uniform, changes in color), Diameter (greater than 6 mm which is about the size of a pencil eraser), and Evolving (any changes in preexisting moles).  - Sun protection: Counseled SPF 30+ sunscreen, UPF clothing, sun avoidance, tanning bed avoidance.    # Cherry angiomas  # Seborrheic keratoses  - We discussed the benign nature of the skin lesions. No treatment required. Continued observation recommended. Follow up with any concerns.      Follow-up:  3 months for follow up full body skin exam, as needed for new or changing lesions or new concerns    All risks, benefits and alternatives were discussed with patient.  Patient is in agreement and understands the assessment and plan.  All questions were answered.  Lu Danielle PA-C  [facility]    ____________________________________________    CC: Derm Problem (Cornelius is here today for a lesion of concern on the chest. He would also light a full body skin check. )    HPI:  Mr. Cornelius Ruiz is a(n) 64 year old male who presents today as a return patient for a full body skin cancer screening. The patient has a history of malignant melanoma. Today, the patient reports a lesion on the chest. The lesion has been present for months has has grown quickly.      During his last visit he was prescribed imiquimod for a lesion suspicious for sBCC on the right back. He was unable to reach this area, he did not use the imiquimod.     He notes a bothersome brown spot on the back that rubs against clothing.      No other specific cutaneous concerns. The patient reports trying to be diligent with photoprotection.      Physical Exam:  Vitals: There were no vitals taken for this visit.  SKIN: Total skin excluding the genitalia areas was performed. The exam included the scalp, face, neck, bilateral arms, chest, back, abdomen, bilateral legs, digits, mons pubis, buttocks, and nails.   - Cannon I-II.  - Pink macule(s) with gritty scale involving the face, back, chest and right forearm, consistent with actinic keratosis.   - Involving the left upper paraspinal back  is a 0.7 x 0.7 cm pink patch with central blue gritty pigment and peripheral reticular network.   - Involving the right chest is a 2 x 2 cm pink nodule with central crust.   - Involving the right upper back is a 0.5 x 0.7 cm pink papule with adherent scale.   - The right back demonstrates waxy papule(s) with an erythematous base, consistent with inflamed seborrheic keratoses.   - Multiple tan/brown macules and papules scattered throughout exam, consistent with benign nevi. No concerning features on dermoscopy.   - Scattered tan, homogenous macules scattered on sun exposed skin, consistent with solar lentigines.   - Scattered waxy, stuck on appearing papules and patches, consistent with seborrheic keratoses.  - Several 1-2 mm red dome shaped symmetric papules, consistent with cherry angiomas.     - No other lesions of concern on areas examined.     Medications:  Current Outpatient Medications   Medication Sig Dispense Refill     aspirin 81 MG EC tablet Take 81 mg by mouth daily       calcipotriene (DOVONOX) 0.005 % external cream Mix together with fluorouracil, 1:1 ratio and apply to the face BID x 4 days. Wash hands  after application. 120 g 1     ciprofloxacin (CIPRO) 500 MG/5ML (10%) suspension Take by mouth 2 times daily       fluorouracil (EFUDEX) 5 % external cream Apply topically 2 times daily 40 g 3     fluorouracil (EFUDEX) 5 % external cream Mix together with calcipotriene, 1:1 ratio and apply to the face BID x 4 days. Wash hands after application. 40 g 1     imiquimod (ALDARA) 5 % external cream Apply daily to area of BCC on R back 24 packet 3     insulin glargine (LANTUS VIAL) 100 UNIT/ML vial Inject Subcutaneous At Bedtime       niacinamide 500 MG tablet Take 1 tablet (500 mg) by mouth 2 times daily (with meals) 180 tablet 1     sildenafil (VIAGRA) 50 MG tablet Take 50 mg by mouth daily as needed       cephALEXin (KEFLEX) 500 MG capsule Take 1 capsule (500 mg) by mouth 3 times daily (Patient not taking: Reported on 12/9/2024) 30 capsule 0     No current facility-administered medications for this visit.      Past Medical History:   There is no problem list on file for this patient.    No past medical history on file.    CC Referred Self, MD  No address on file on close of this encounter.      Again, thank you for allowing me to participate in the care of your patient.      Sincerely,    Lu Danielle PA-C

## 2024-12-09 NOTE — PATIENT INSTRUCTIONS
Wound Care After a Biopsy    What is a skin biopsy?  A skin biopsy allows the doctor to examine a very small piece of tissue under the microscope to determine the diagnosis and the best treatment for the skin condition. A local anesthetic (numbing medicine) is injected with a very small needle into the skin area to be tested. A small piece of skin is taken from the area. Sometimes a suture (stitch) is used.     What are the risks of a skin biopsy?  I will experience scar, bleeding, swelling, pain, crusting and redness. I may experience incomplete removal or recurrence. Risks of this procedure are excessive bleeding, bruising, infection, nerve damage, numbness, thick (hypertrophic or keloidal) scar and non-diagnostic biopsy.    How should I care for my wound for the first 24 hours?  Keep the wound dry and covered for 24 hours  If it bleeds, hold direct pressure on the area for 15 minutes. If bleeding does not stop, call us or go to the emergency room  Avoid strenuous exercise the first 1-2 days or as your doctor instructs you    How should I care for the wound after 24 hours?  After 24 hours, remove the bandage  You may bathe or shower as normal  If you had a scalp biopsy, you can shampoo as usual and can use shower water to clean the biopsy site daily  Clean the wound once a day with gentle soap and water  Do not scrub, be gentle  Apply white petroleum/Vaseline after cleaning the wound with a cotton swab or a clean finger, and keep the site covered with a Bandaid /bandage. Bandages are not necessary with a scalp biopsy  If you are unable to cover the site with a Bandaid /bandage, re-apply ointment 2-3 times a day to keep the site moist. Moisture will help with healing  Avoid strenuous activity for first 1-2 days  Avoid lakes, rivers, pools, and oceans until the stitches are removed or the site is healed    How do I clean my wound?  Wash hands thoroughly with soap or use hand  before all wound care  Clean  the wound with gentle soap and water  Apply white petroleum/Vaseline  to wound after it is clean  Replace the Bandaid /bandage to keep the wound covered for the first few days or as instructed by your doctor  If you had a scalp biopsy, warm shower water to the area on a daily basis should suffice    What should I use to clean my wound?   Cotton-tipped applicators (Qtips )  White petroleum jelly (Vaseline ). Use a clean new container and use Q-tips to apply.  Bandaids  as needed  Gentle soap     How should I care for my wound long term?  Do not get your wound dirty  Keep up with wound care for one week or until the area is healed.  If you have stitches, stitches need to be removed in 14 days. You may return to our clinic for this or you may have it done locally at your doctor s office.  A small scab will form and fall off by itself when the area is completely healed. The area will be red and will become pink in color as it heals. Sun protection is very important for how your scar will turn out. Sunscreen with an SPF 30 or greater is recommended once the area is healed.  You should have some soreness but it should be mild and slowly go away over several days. Talk to your doctor about using tylenol for pain,    When should I call my doctor?  If you have increased:   Pain or swelling  Pus or drainage (clear or slightly yellow drainage is ok)  Temperature over 100F  Spreading redness or warmth around wound    When will I hear about my results?  The biopsy results can take 2 weeks to come back.  Your results will automatically release to [email or web address] before your provider has even reviewed them.  The clinic will call you with the results, send you a [email or web address] message, or have you schedule a follow-up clinic or phone time to discuss the results.  Contact our clinics if you do not hear from us in 2 weeks.    Who should I call with questions?  [facility]: [phone]  [facility]  [facility]: [phone]  For urgent needs outside of business hours call the [facility] at [phone] and ask for the dermatology resident on call

## 2024-12-09 NOTE — PROGRESS NOTES
[facility] Dermatology Note  Encounter Date: Dec 9, 2024  Office Visit     Reviewed patients past medical history and pertinent chart review prior to patients visit today.     Dermatology Problem List:  # LTM - nasal tip - resolved 12/9/2024 s/p Efudex- Dovonex    # NUB, right chest, shave biopsy 12/9/2024   # NUB, right upper back, shave biopsy 12/9/2024   # NUB, left upper paraspinal back, shave biopsy 12/9/2024     1. Melanoma in situ, left back, s/p bx 4/1/22, s/p excision  2. NMSC  - niacinamide 500 mg po BID  - SCC, right sideburn, s/p bx 4/1/22, s/p MMS 4/26/22  - SCC, right lateral cheek, s/p bx 4/1/22, s/p MMS 4/26/22  - BCC, right upper chest, s/p bx 4/1/22, s/p excision 05/2022  - BCC, left upper chest, s/p bx 4/1/22, s/p excision 05/2022  - 11/18/22 SCC R anterior shoulder  - 11/18/22 nBCC R mid cheek s/p mohs  - 11/18/22 SCC L lateral cheek s/p mohs  - 11/18/22 MM Mid paraspinal back 0.5mm BD s/p WLE  - 11/18/22 SCCIS L scapular back s/p mohs  - 11/18/22 SCCIS R medial clavicle  s/p mohs  3. 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 Cellulitis, chest  - Doxycycline 100 mg BID x10 days initiated 5/25/2022  5. Diffuse actinic damage   - Efudex/calcipotriene cream (initiaited to face 11/18/2022)  - previously completed efudex alone on the chest with moderate to good response  - future: consider PDT    ____________________________________________    Assessment & Plan:     # Neoplasm of uncertain behavior:  right chest  DDx includes SCC vs BCC. Shave biopsy today.  # Neoplasm of uncertain behavior:  right upper back  DDx includes BCC vs SCC. Shave biopsy today.  # Neoplasm of uncertain behavior:  left upper paraspinal back  DDx includes atypical nevus vs lichenoid keratosis vs melanoma. Shave biopsy today.    Procedure Note: Biopsy by shave technique  The risks and benefits of the procedure were described to the patient. These include but are not limited to bleeding, infection, scar,  incomplete removal, and non-diagnostic biopsy. Verbal informed consent was obtained. The above site(s) was cleansed with an alcohol pad and injected with 1% lidocaine with epinephrine. Once anesthesia was obtained, a biopsy(ies) was performed with Gilette blade. The tissue(s) was placed in a labeled container(s) with formalin and sent to pathology. Hemostasis was achieved with aluminum chloride. Vaseline and a bandage were applied to the wound(s). The patient tolerated the procedure well and was given post biopsy care instructions.    #Actinic keratosis x21  - Due to the diffuse nature of the actinic keratoses involving the face, we discussed options for treatment including liquid nitrogen cryotherapy versus topical calcipotriene 0.005% and fluorouracil 5% cream.  The patient was interested in undergoing topical therapy.  We discussed side effects including redness, pain, and sun sensitivity.  I recommended diligent avoidance of application to the eyes.  The patient had no further questions.    Cryotherapy procedure note, location(s): back x12, chest x7, abdomen x1, right forearm x1 After verbal consent and discussion of risks and benefits including, but not limited to, dyspigmentation/scar, blister, and pain, the lesion(s) was(were) treated with 1-2 mm freeze border for 1-2 cycles with liquid nitrogen. Post cryotherapy instructions were provided.    # Inflamed seborrheic keratoses x1  The benign nature of the skin lesion(s) was discussed with the patient.  Due to the inflamed and irritated nature of the lesions I recommend cryotherapy and the patient was agreeable.      Cryotherapy procedure note, location(s): right mid back After verbal consent and discussion of risks and benefits including, but not limited to, dyspigmentation/scar, blister, and pain, the lesion(s) was(were) treated with 1-2 mm freeze border for 1-2 cycles with liquid nitrogen. Post cryotherapy instructions were provided.    # Personal history of  malignant melanoma  # Personal history nonmelanoma skin cancer  # Multiple nevi, trunk and extremities  # Solar lentigines  - No signs of recurrence. Continued observation recommended.   - Nevi demonstrate no concerning features on dermoscopy. We discussed the importance of self exams at home.   - ABCDEs: Counseled ABCDEs of melanoma: Asymmetry, Border (irregularity), Color (not uniform, changes in color), Diameter (greater than 6 mm which is about the size of a pencil eraser), and Evolving (any changes in preexisting moles).  - Sun protection: Counseled SPF 30+ sunscreen, UPF clothing, sun avoidance, tanning bed avoidance.    # Cherry angiomas  # Seborrheic keratoses  - We discussed the benign nature of the skin lesions. No treatment required. Continued observation recommended. Follow up with any concerns.      Follow-up:  3 months for follow up full body skin exam, as needed for new or changing lesions or new concerns    All risks, benefits and alternatives were discussed with patient.  Patient is in agreement and understands the assessment and plan.  All questions were answered.  Lu Danielle PA-C  [facility] Dermatology    ____________________________________________    CC: Derm Problem (Cornelius is here today for a lesion of concern on the chest. He would also light a full body skin check. )    HPI:  Mr. Cornelius Ruiz is a(n) 64 year old male who presents today as a return patient for a full body skin cancer screening. The patient has a history of malignant melanoma. Today, the patient reports a lesion on the chest. The lesion has been present for months has has grown quickly.     During his last visit he was prescribed imiquimod for a lesion suspicious for sBCC on the right back. He was unable to reach this area, he did not use the imiquimod.     He notes a bothersome brown spot on the back that rubs against clothing.      No other specific cutaneous concerns. The patient reports trying to be diligent  with photoprotection.      Physical Exam:  Vitals: There were no vitals taken for this visit.  SKIN: Total skin excluding the genitalia areas was performed. The exam included the scalp, face, neck, bilateral arms, chest, back, abdomen, bilateral legs, digits, mons pubis, buttocks, and nails.   - Cannon I-II.  - Pink macule(s) with gritty scale involving the face, back, chest and right forearm, consistent with actinic keratosis.   - Involving the left upper paraspinal back  is a 0.7 x 0.7 cm pink patch with central blue gritty pigment and peripheral reticular network.   - Involving the right chest is a 2 x 2 cm pink nodule with central crust.   - Involving the right upper back is a 0.5 x 0.7 cm pink papule with adherent scale.   - The right back demonstrates waxy papule(s) with an erythematous base, consistent with inflamed seborrheic keratoses.   - Multiple tan/brown macules and papules scattered throughout exam, consistent with benign nevi. No concerning features on dermoscopy.   - Scattered tan, homogenous macules scattered on sun exposed skin, consistent with solar lentigines.   - Scattered waxy, stuck on appearing papules and patches, consistent with seborrheic keratoses.  - Several 1-2 mm red dome shaped symmetric papules, consistent with cherry angiomas.     - No other lesions of concern on areas examined.     Medications:  Current Outpatient Medications   Medication Sig Dispense Refill    aspirin 81 MG EC tablet Take 81 mg by mouth daily      calcipotriene (DOVONOX) 0.005 % external cream Mix together with fluorouracil, 1:1 ratio and apply to the face BID x 4 days. Wash hands after application. 120 g 1    ciprofloxacin (CIPRO) 500 MG/5ML (10%) suspension Take by mouth 2 times daily      fluorouracil (EFUDEX) 5 % external cream Apply topically 2 times daily 40 g 3    fluorouracil (EFUDEX) 5 % external cream Mix together with calcipotriene, 1:1 ratio and apply to the face BID x 4 days. Wash hands after  application. 40 g 1    imiquimod (ALDARA) 5 % external cream Apply daily to area of BCC on R back 24 packet 3    insulin glargine (LANTUS VIAL) 100 UNIT/ML vial Inject Subcutaneous At Bedtime      niacinamide 500 MG tablet Take 1 tablet (500 mg) by mouth 2 times daily (with meals) 180 tablet 1    sildenafil (VIAGRA) 50 MG tablet Take 50 mg by mouth daily as needed      cephALEXin (KEFLEX) 500 MG capsule Take 1 capsule (500 mg) by mouth 3 times daily (Patient not taking: Reported on 12/9/2024) 30 capsule 0     No current facility-administered medications for this visit.      Past Medical History:   There is no problem list on file for this patient.    No past medical history on file.    CC Referred Self, MD  No address on file on close of this encounter.

## 2024-12-09 NOTE — NURSING NOTE
Dermatology Rooming Note    Cornelius Ruiz's goals for this visit include:   Chief Complaint   Patient presents with    Derm Problem     Cornelius is here today for a lesion of concern on the chest. He would also light a full body skin check.      Manjit GALLO CMA

## 2024-12-09 NOTE — NURSING NOTE
Lidocaine-epinephrine 1-1:074127 % injection   2mL once for one use, starting 12/9/2024 ending 12/9/2024,  2mL disp, R-0, injection  Injected by Manjit GALLO CMA

## 2024-12-16 ENCOUNTER — HOSPITAL ENCOUNTER (OUTPATIENT)
Dept: MRI IMAGING | Facility: CLINIC | Age: 64
Discharge: HOME | End: 2024-12-16
Attending: INTERNAL MEDICINE | Admitting: INTERNAL MEDICINE
Payer: COMMERCIAL

## 2024-12-16 DIAGNOSIS — R03.0 ELEVATED BLOOD PRESSURE READING WITHOUT DIAGNOSIS OF HYPERTENSION: ICD-10-CM

## 2024-12-16 DIAGNOSIS — Z71.3 DIETARY COUNSELING AND SURVEILLANCE: ICD-10-CM

## 2024-12-16 DIAGNOSIS — R11.0 CHRONIC NAUSEA: ICD-10-CM

## 2024-12-16 DIAGNOSIS — Z87.19 HISTORY OF ACUTE PANCREATITIS: ICD-10-CM

## 2024-12-16 PROCEDURE — 74183 MRI ABD W/O CNTR FLWD CNTR: CPT

## 2024-12-16 PROCEDURE — A9585 GADOBUTROL INJECTION: HCPCS | Performed by: INTERNAL MEDICINE

## 2024-12-16 PROCEDURE — 255N000002 HC RX 255 OP 636: Performed by: INTERNAL MEDICINE

## 2024-12-16 RX ORDER — GADOBUTROL 604.72 MG/ML
0.1 INJECTION INTRAVENOUS ONCE
Status: COMPLETED | OUTPATIENT
Start: 2024-12-16 | End: 2024-12-16

## 2024-12-16 RX ADMIN — GADOBUTROL 10 ML: 604.72 INJECTION INTRAVENOUS at 10:43

## 2024-12-17 ENCOUNTER — TELEPHONE (OUTPATIENT)
Dept: DERMATOLOGY | Facility: CLINIC | Age: 64
End: 2024-12-17
Payer: COMMERCIAL

## 2024-12-17 DIAGNOSIS — C44.529 SQUAMOUS CELL CARCINOMA OF SKIN OF CHEST: ICD-10-CM

## 2024-12-17 DIAGNOSIS — D03.59 MELANOMA IN SITU OF BACK (H): Primary | ICD-10-CM

## 2024-12-17 DIAGNOSIS — C44.91 SUPERFICIAL BASAL CELL CARCINOMA (BCC): ICD-10-CM

## 2024-12-18 ENCOUNTER — TELEPHONE (OUTPATIENT)
Dept: DERMATOLOGY | Facility: CLINIC | Age: 64
End: 2024-12-18
Payer: COMMERCIAL

## 2024-12-18 NOTE — TELEPHONE ENCOUNTER
Called patient to schedule surgery with Dr. Peralta    Date of Surgery: 01/27    Surgery type: Mohs    Consult scheduled: No    Has patient had mohs with us before? Yes    Additional comments: would like sooner.       Areli Dailey on 12/18/2024 at 10:43 AM

## 2024-12-24 ENCOUNTER — HOSPITAL ENCOUNTER (OUTPATIENT)
Dept: NUCLEAR MEDICINE | Facility: CLINIC | Age: 64
Setting detail: NUCLEAR MEDICINE
Discharge: HOME | End: 2024-12-24
Attending: INTERNAL MEDICINE
Payer: COMMERCIAL

## 2024-12-24 DIAGNOSIS — Z71.3 DIETARY COUNSELING AND SURVEILLANCE: ICD-10-CM

## 2024-12-24 DIAGNOSIS — R11.0 CHRONIC NAUSEA: ICD-10-CM

## 2024-12-24 DIAGNOSIS — Z87.19 HISTORY OF ACUTE PANCREATITIS: ICD-10-CM

## 2024-12-24 DIAGNOSIS — R03.0 ELEVATED BLOOD PRESSURE READING WITHOUT DIAGNOSIS OF HYPERTENSION: ICD-10-CM

## 2024-12-24 PROCEDURE — A9541 TC99M SULFUR COLLOID: HCPCS | Performed by: INTERNAL MEDICINE

## 2024-12-24 PROCEDURE — 78264 GASTRIC EMPTYING IMG STUDY: CPT

## 2024-12-24 PROCEDURE — 343N000001 HC RX 343 MED OP 636: Performed by: INTERNAL MEDICINE

## 2024-12-24 RX ADMIN — Medication 2 MILLICURIE: at 08:07

## 2025-01-20 ENCOUNTER — TELEPHONE (OUTPATIENT)
Dept: DERMATOLOGY | Facility: CLINIC | Age: 65
End: 2025-01-20

## 2025-01-20 ENCOUNTER — OFFICE VISIT (OUTPATIENT)
Dept: DERMATOLOGY | Facility: CLINIC | Age: 65
End: 2025-01-20
Attending: PHYSICIAN ASSISTANT
Payer: COMMERCIAL

## 2025-01-20 VITALS — DIASTOLIC BLOOD PRESSURE: 99 MMHG | SYSTOLIC BLOOD PRESSURE: 144 MMHG | HEART RATE: 71 BPM

## 2025-01-20 DIAGNOSIS — C44.91 SUPERFICIAL BASAL CELL CARCINOMA (BCC): ICD-10-CM

## 2025-01-20 DIAGNOSIS — L57.0 ACTINIC KERATOSIS: Primary | ICD-10-CM

## 2025-01-20 DIAGNOSIS — D48.9 NEOPLASM OF UNCERTAIN BEHAVIOR: ICD-10-CM

## 2025-01-20 DIAGNOSIS — D03.59 MELANOMA IN SITU OF BACK (H): ICD-10-CM

## 2025-01-20 DIAGNOSIS — C44.529 SQUAMOUS CELL CARCINOMA OF SKIN OF CHEST: ICD-10-CM

## 2025-01-20 PROCEDURE — 88342 IMHCHEM/IMCYTCHM 1ST ANTB: CPT | Mod: TC | Performed by: DERMATOLOGY

## 2025-01-20 PROCEDURE — 88305 TISSUE EXAM BY PATHOLOGIST: CPT | Mod: TC | Performed by: DERMATOLOGY

## 2025-01-20 PROCEDURE — 88342 IMHCHEM/IMCYTCHM 1ST ANTB: CPT | Mod: 26 | Performed by: PATHOLOGY

## 2025-01-20 PROCEDURE — 88305 TISSUE EXAM BY PATHOLOGIST: CPT | Mod: 26 | Performed by: PATHOLOGY

## 2025-01-20 RX ORDER — BUSPIRONE HYDROCHLORIDE 5 MG/1
5 TABLET ORAL DAILY
COMMUNITY
Start: 2025-01-06

## 2025-01-20 ASSESSMENT — PAIN SCALES - GENERAL: PAINLEVEL_OUTOF10: MILD PAIN (1)

## 2025-01-20 NOTE — PATIENT INSTRUCTIONS
Wound care    I will experience scar, bleeding, swelling, pain, crusting and redness. I may experience incomplete removal or recurrence. Risks are bleeding, bruising, swelling, infection, nerve damage, & a large wound. A second procedure may be recommended to obtain the best cosmetic or functional result.       A three month office visit with your Surgeon is recommended for scar evaluation. Please reach out sooner if you have concerns about you surgical site/wound.    Caring for your skin after surgery    After your surgery, a pressure bandage will be placed over the area that has stitches. This is important to prevent bleeding. Please follow these instructions over the next 1 to 2 weeks. Following this regimen will help to prevent complications as your wound heals.     For the first 48 hours after your surgery:    Leave the pressure dressing on and keep it dry. If it should come loose, you may re-tape it, but do not take it off.  Relax and take it easy. Do not do any vigorous exercise or heavy lifting. This could cause the wound to bleed.  Post-Operative pain is usually mild. If you are able to take tylenol, You may take plain or extra-strength Tylenol (acetaminophen) As directed on the bottle (do not take more than 4,000mg in one day). If you are able to take ibuprofen, you can alternate the tylenol and ibuprofen.   Avoid alcohol as this may increase your tendency to bleed.   You may put an ice pack around the bandaged area for 20 minutes at a time as needed. This may help reduce swelling, bruising, and pain. Make sure the ice pack is waterproof so that the pressure bandage doesn t get wet.  If the wound is on the face try to sleep with your head elevated. Either in a recliner or propped up in bed, this will decrease swelling around the eyes.   You may see a small amount of drainage or blood on your pressure bandage. This is normal. However:  If drainage or bleeding continues or saturates the bandage, you will  need to apply firm pressure over the bandage with a piece of gauze for 15 minutes.  If bleeding continues after applying pressure for 15 minutes, apply an ice pack to the bandaged area for 15 minutes.  If bleeding still continues, call our office or go to the nearest emergency room.    Remove pressure dressing 48 hours after surgery:    Carefully remove the pressure bandage. If it seems sticky or too difficult to get off, you may need to soak it off in the shower.  After the pressure dressing is removed, you may shower and get the wound wet. However, Do Not let the forceful stream of the shower hit the wound directly.  Follow these wound care and dressing change instructions:    You have skin glue over the stitches. This will dry and flake off with time (about 2 weeks). If the stitches are still hanging around after 2 weeks, let us know, we can clip them out for you if they do not fall out with washing.   You may allow water to run over the site. Do not soak.  Do Not rub or scrub the site.  Pat dry after the shower or bath.  Avoid topical medications, lotions, creams, ointment,or oils.  Do not use tanning lamps or expose the site to sun.   Check wound appearance daily, some swelling and redness is normal after a procedure but should go away as your would is healing. If the swelling and redness or pain increases or if any other signs of infection occur listed below please send in a photo via my chart and or call us to let us know.  The clear glue film should start peeling and flaking off approximately around 2 weeks. By this time your wound should be sufficiently healed. If it still looks to be healing when the glue comes off you can clean the wound with soapy warm water daily in the shower. No need to bandage further, but you can put a bandage on the area daily for the two weeks if you would like.   If skin glue and sutures are still intact at 2 weeks after your procedure, you can start applying Vaseline daily to  "help soften up the skin glue. It will come off easier this way.        If you are able to take acetaminophen (\"Tylenol,\" etc.) and ibuprofen (\"Advil\" or \"Motrin,\" etc.), then you may STAGGER these medications by taking 400 mg of ibuprofen (usually two tabs) every 8 hours and 1,000 mg of acetaminophen (e.g., two tabs of extra-strength Tylenol) every 8 hours.    This means, for example, that you could take the followin,000 mg of Tylenol, followed 4 hours later by 400 mg Ibuprofen, followed 4 hours later with 1,000 mg of Tylenol, followed 4 hours later by 400 mg Ibuprofen, followed 4 hours later with 1,000 mg of Tylenol, and so forth.     Essentially, you can take either 1,000 mg of Tylenol or 400 mg of ibuprofen in alternating fashion EVERY FOUR HOURS.    Do NOT exceed more than 4,000 mg of Tylenol or 3,200 mg of ibuprofen per 24 hours. If you are not able to take Tylenol or ibuprofen as above due to other health issues (or a physician has told you directly that you are not allowed to take one of them, say due to pre-existing severe liver or kidney issues), then disregard the above directions.    Scientific evidence supports that this combination/schedule of pain medications is just as effective, if not more effective, than taking a narcotic pain medicine.       Follow up will be a 3 month scar evaluation either in person or via a telephone visit with you sending in a photo via [employer]. Unless you have been told to follow up sooner or if you have concerns and would like to be see sooner. Please call or send us in a [employer] message if possible and attach a photo.        What to expect:    The first couple of days your wound may be tender and may bleed slightly when doing wound care.  There may be swelling and bruising around the wound, especially if it is near the eyes. For your comfort, you may apply ice or cold compresses to the bruises after your have removed the pressure bandage.  The area around your wound may " be numb for several weeks or even months.  You may experience periodic sharp pain or mild itching around the wound as it heals.   The suture line will look dark for a while but will lighten over time.       When to call us:    You have bleeding that will not stop after applying pressure and ice.  You have pain that is not controlled with Tylenol (acetaminophen.)  You have signs or symptoms of an infection such as:  Fever over 100 degrees Fahrenheit  Redness, warmth or foul-smelling drainage from the wound  If you have any questions, or are not sure how to take care of the wound.    Phone numbers:    During business hours (M-F 8:00-4:30 p.m.)  [facility]-  [phone] Option 1 appt. Desk and ask for the Dermatology Surgery Team  [phone] [facility] .     ---------------------------------------------------------  Evenings/Weekends/Holidays  Hospital - [phone]   TTY for hearing xgehsosz-[phone]  *Ask  to page dermatologist on-call  Emergency [facility]-[phone]  TTY for hearing impaired- [phone]        none

## 2025-01-20 NOTE — NURSING NOTE
Chief Complaint   Patient presents with    Procedure     Mohs and reconstruction to right chest, excision to left upper paraspinal back.     Lisa PORTER CMA

## 2025-01-20 NOTE — LETTER
1/20/2025       RE: Cornelius Ruiz  [address]  [facility] [address]     Dear Colleague,    Thank you for referring your patient, Cornelius Ruiz, to the [facility] DERMATOLOGIC SURGERY CLINIC [place] at [facility]. Please see a copy of my visit note below.    [facility] Dermatologic Surgery Clinic [place] Procedure Note    Dermatology Problem List:  # NUB, R nostril, s/p shave bx 1/20/2025  # LTM - nasal tip - resolved 12/9/2024 s/p Efudex- Dovonex   1. Melanoma in situ  - MIS/LM, left upper paraspinal back, s/p WLE 1/20/2025  - MIS, left back, s/p bx 4/1/22, s/p excision  2. NMSC  - niacinamide 500 mg po BID  - SCC, right chest, s/p MMS 01/20/25  - BCC, right upper back, s/p ED&C 01/20/25  - SCC, right sideburn, s/p bx 4/1/22, s/p MMS 4/26/22  - SCC, right lateral cheek, s/p bx 4/1/22, s/p MMS 4/26/22  - BCC, right upper chest, s/p bx 4/1/22, s/p excision 05/2022  - BCC, left upper chest, s/p bx 4/1/22, s/p excision 05/2022  - 11/18/22 SCC R anterior shoulder  - 11/18/22 nBCC R mid cheek s/p mohs  - 11/18/22 SCC L lateral cheek s/p mohs  - 11/18/22 MM Mid paraspinal back 0.5mm BD s/p WLE  - 11/18/22 SCCIS L scapular back s/p mohs  - 11/18/22 SCCIS R medial clavicle  s/p mohs  3. 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 Cellulitis, chest  - Doxycycline 100 mg BID x10 days initiated 5/25/2022  5. Diffuse actinic damage   - Efudex/calcipotriene cream (initiaited to face 11/18/2022)  - previously completed efudex alone on the chest with moderate to good response  - future: consider PDT    [facility] Dermatologic Surgery Clinic [place] Procedure Note 1      Date of Service:  Jan 20, 2025  Surgery: Mohs micrographic surgery    Case 1  Repair Type: intermediate  Repair Size: 8.6 cm  Suture Material: Monocryl 4-0, vicryl rapide 5-0  Tumor Type: SCC - Squamous cell carcinoma  Location: Right chest  Derm-Path Accession #:  [id number]  PreOp Size: 4.5 x 3.0 cm  PostOp Size: 4.7 x 5.2 cm  Mohs Accession #: [id number]  Level of Defect: fat      Procedure:  We discussed the principles of treatment and most likely complications including scarring, bleeding, infection, swelling, pain, crusting, nerve damage, large wound,  incomplete excision, wound dehiscence,  nerve damage, recurrence, and a second procedure may be recommended to obtain the best cosmetic or functional result.    Informed consent was obtained and the patient underwent the procedure as follows:  The patient was placed supine on the operating table.  The cancer was identified, outlined with a marker, and verified by the patient.  The entire surgical field was prepped with Hibiclens.  The surgical site was anesthetized using Lidocaine 1% with epi 1:100,000.      The area of clinically apparent tumor was not debulked. The layer of tissue was then surgically excised using a #15 blade and was then transferred onto a specimen sheet maintaining the orientation of the specimen. Hemostasis was obtained using heat cautery. The wound site was then covered with a dressing while the tissue samples were processed for examination.    The excised tissue was transported to the Mohs histology laboratory maintaining the tissue orientation.  The tissue specimen was relaxed so that the entire surgical margin was in a a single horizontal plane for sectioning and inked for precise mapping.  A precise reference map was drawn to reflect the sectioning of the specimen, colored inking of the margins, and orientation on the patient. The tissue was processed using horizontal sectioning of the base and continuous peripheral margins.  The histopathologic sections were reviewed in conjunction with the reference map.    Total blocks: 4    Total slides:  12    There were no cancer cells visualized on examination, therefore Mohs surgery was complete.    Reconstruction: Intermediate Linear Closure      The patient  was taken to the operative suite and placed supine on the operating room table. The defect was identified. Appropriate markings were made with a marking pen to plan the repair. The area was infiltrated with Lidocaine 1% with epi 1:100,000 and prepped with Hibiclens and draped with sterile towels.     The wound was debeveled and undermined widely. Cones were excised within relaxed skin tension lines on both sides of the defect. Hemostasis was obtained using heat cautery. The deeper layers of subcutaneous and superficial (nonmuscle) fascia tissues were then approximated using monocryl 4-0 buried vertical mattress sutures (deep layer suturing). The wound edges were then approximated; additional buried sutures were placed in a similar fashion where needed. vicryl rapide 5-0 simple running (superficial layer suturing) were carefully placed for maximum eversion and meticulous approximation.      Estimated blood loss was less than 10 ml for all surgical sites. A sterile pressure dressing was applied and wound care instructions, with a written handout, were given. The patient was discharged from the Dermatologic Surgery Center alert and ambulatory.    The patient elected for pathology results to automatically release and understands that the clinical staff will contact them as soon as possible to notify them of the results.    Repair Size: 8.6 cm    The wound was cleansed with saline and ointment was applied along the wound surface.     A sterile pressure dressing was applied.  Wound care instructions were given verbally and in writing.  The patient left the operating suite in stable condition.  Patient was informed that additional refinement of the resulting surgical scar may be used as a second stage of this reconstruction.     Dr Velazquez performed the Mohs, Dr Peralta the repair    The attending surgeon was present for key portions of the above major procedure and examination.    Dermatology Procedure Note 2: Excision  intermediate layered linear closure    NAME OF PROCEDURE: Excision intermediate layered linear closure  Staff surgeon: Jean Carlos Peralta MD  Resident/Fellow: Tru Santiago MD    PRE-OPERATIVE DIAGNOSIS:  Melanoma in situ  POST-OPERATIVE DIAGNOSIS: Same   LOCATION: Left upper paraspinal back  FINAL EXCISION SIZE(DEFECT SIZE): 3 x 2.4 cm  MARGIN: 0.5 cm  FINAL REPAIR LENGTH: 5.2 cm   ANESTHESIA: 2cc 1% lidocaine with 1:100,000 epinephrine    INDICATIONS: This patient presented with a 2 x 1.4 cm Melanoma in situ. Excision was indicated. We discussed the principles of treatment and most likely complications including scarring, bleeding, infection, incomplete excision, wound dehiscence, pain, nerve damage, and recurrence. Informed consent was obtained and the patient underwent the procedure as follows:    PROCEDURE: The patient was taken to the operative suite. Time-out was performed.  The treatment area was anesthetized with 1% lidocaine with epinephrine. The area was prepped with Chlorhexidine and rinsed with sterile saline and draped with sterile towels. The lesion was delineated and excised down to subcutaneous fat in a elliptical manner. Hemostasis was obtained by electrocoagulation.     REPAIR: An intermediate layered linear closure was selected as the procedure which would maximally preserve both function and cosmesis.    After the excision of the tumor, the area was carefully undermined. Hemostasis was obtained with electrocoagulation.  Closure was oriented so that the wound was in the patient's natural skin tension lines. The subcutaneous and dermal layers were then closed with 3-0 vicryl sutures. The epidermis was then carefully approximated along the length of the wound using 4-0 monocryl running subcuticular sutures.     Estimated blood loss was less than 10 ml for all surgical sites. A sterile pressure dressing was applied and wound care instructions, with a written handout, were given. The patient was  discharged from the Dermatologic Surgery Center alert and ambulatory.    The patient elected for pathology results to automatically release and understands that the clinical staff will contact them as soon as possible to notify them of the results.    Dr. Jean Carlos Peralta was immediately available for the entire surgery and was physicially present for the key portions of the procedure.    Anatomic Pathology Results: pending    Dermatology Procedure Note 3: Electrodesiccation and Curettage    PREOPERATIVE DIAGNOSIS: BCC    POSTOPERATIVE DIAGNOSIS: same    LOCATION: Right upper back    SIZE: 2 x 1.0 cm     Treatment options including electrodessiccation and curettage (ED and C), excision and topicals were reviewed.  The expected cure rates, healing times and anticipated scars of each option were discussed and the patient elects to proceed with ED and C.     The risks and benefits of the procedure were described to the patient.  These include but are not limited to bleeding, infection, scar, incomplete removal, and recurrence. Written informed consent was obtained. Time-out was performed. The above site was cleansed with and injected with 1 mL 1% lidocaine with epinephrine. Once anesthesia was obtained, the site was prepped with Chlorhexidine and rinsed with sterile saline. The lesion was curetted with in 3 directions with a 2 mm margin and this was followed by electrodessication.  This process was repeated three times. The defect measured 2.2 x 1.2 cm. Vaseline and a bandage were applied to the wound. The patient tolerated the procedure well and was given post care instructions.    Clinical Follow-up: As scheduled with primary dermatologist.    Staff Involved:  Staff/Scribe/Fellow    Scribe Disclosure:   I, Juliann Huerta, am serving as a scribe; to document services personally performed by Jean Carlos Peralta MD, based on data collection and the provider's statements to me.     Provider Disclosure:  I agree with the above history,  review of systems, physical exam and plan.  I have reviewed the content of the documentation and have edited it as needed. I have personally performed the services documented here and the documentation accurately represents those services and the decisions I have made.      Electronically signed by:  Attending attestation:  I personally performed the entire procedure.  I have reviewed the note and edited it as necessary, and agree with its contents.    Jean Carlos Peralta M.D.  Professor  Director of Dermatologic Surgery  Department of Dermatology  [facility]    Dermatology Surgery Clinic  [place] [address]      Again, thank you for allowing me to participate in the care of your patient.      Sincerely,    Jean Carlos Peralta MD

## 2025-01-20 NOTE — PROGRESS NOTES
[facility] Dermatologic Surgery Clinic [place] Procedure Note    Dermatology Problem List:  # NUB, R nostril, s/p shave bx 1/20/2025  # LTM - nasal tip - resolved 12/9/2024 s/p Efudex- Dovonex   1. Melanoma in situ  - MIS/LM, left upper paraspinal back, s/p WLE 1/20/2025  - MIS, left back, s/p bx 4/1/22, s/p excision  2. NMSC  - niacinamide 500 mg po BID  - SCC, right chest, s/p MMS 01/20/25  - BCC, right upper back, s/p ED&C 01/20/25  - SCC, right sideburn, s/p bx 4/1/22, s/p MMS 4/26/22  - SCC, right lateral cheek, s/p bx 4/1/22, s/p MMS 4/26/22  - BCC, right upper chest, s/p bx 4/1/22, s/p excision 05/2022  - BCC, left upper chest, s/p bx 4/1/22, s/p excision 05/2022  - 11/18/22 SCC R anterior shoulder  - 11/18/22 nBCC R mid cheek s/p mohs  - 11/18/22 SCC L lateral cheek s/p mohs  - 11/18/22 MM Mid paraspinal back 0.5mm BD s/p WLE  - 11/18/22 SCCIS L scapular back s/p mohs  - 11/18/22 SCCIS R medial clavicle  s/p mohs  3. 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 Cellulitis, chest  - Doxycycline 100 mg BID x10 days initiated 5/25/2022  5. Diffuse actinic damage   - Efudex/calcipotriene cream (initiaited to face 11/18/2022)  - previously completed efudex alone on the chest with moderate to good response  - future: consider PDT    [facility] Dermatologic Surgery Clinic [place] Procedure Note 1      Date of Service:  Jan 20, 2025  Surgery: Mohs micrographic surgery    Case 1  Repair Type: intermediate  Repair Size: 8.6 cm  Suture Material: Monocryl 4-0, vicryl rapide 5-0  Tumor Type: SCC - Squamous cell carcinoma  Location: Right chest  Derm-Path Accession #: [id number]  PreOp Size: 4.5 x 3.0 cm  PostOp Size: 4.7 x 5.2 cm  Mohs Accession #: [id number]  Level of Defect: fat      Procedure:  We discussed the principles of treatment and most likely complications including scarring, bleeding, infection, swelling, pain, crusting, nerve damage, large wound,  incomplete excision, wound dehiscence,  nerve  damage, recurrence, and a second procedure may be recommended to obtain the best cosmetic or functional result.    Informed consent was obtained and the patient underwent the procedure as follows:  The patient was placed supine on the operating table.  The cancer was identified, outlined with a marker, and verified by the patient.  The entire surgical field was prepped with Hibiclens.  The surgical site was anesthetized using Lidocaine 1% with epi 1:100,000.      The area of clinically apparent tumor was not debulked. The layer of tissue was then surgically excised using a #15 blade and was then transferred onto a specimen sheet maintaining the orientation of the specimen. Hemostasis was obtained using heat cautery. The wound site was then covered with a dressing while the tissue samples were processed for examination.    The excised tissue was transported to the Mohs histology laboratory maintaining the tissue orientation.  The tissue specimen was relaxed so that the entire surgical margin was in a a single horizontal plane for sectioning and inked for precise mapping.  A precise reference map was drawn to reflect the sectioning of the specimen, colored inking of the margins, and orientation on the patient. The tissue was processed using horizontal sectioning of the base and continuous peripheral margins.  The histopathologic sections were reviewed in conjunction with the reference map.    Total blocks: 4    Total slides:  12    There were no cancer cells visualized on examination, therefore Mohs surgery was complete.    Reconstruction: Intermediate Linear Closure      The patient was taken to the operative suite and placed supine on the operating room table. The defect was identified. Appropriate markings were made with a marking pen to plan the repair. The area was infiltrated with Lidocaine 1% with epi 1:100,000 and prepped with Hibiclens and draped with sterile towels.     The wound was debeveled and undermined  widely. Cones were excised within relaxed skin tension lines on both sides of the defect. Hemostasis was obtained using heat cautery. The deeper layers of subcutaneous and superficial (nonmuscle) fascia tissues were then approximated using monocryl 4-0 buried vertical mattress sutures (deep layer suturing). The wound edges were then approximated; additional buried sutures were placed in a similar fashion where needed. vicryl rapide 5-0 simple running (superficial layer suturing) were carefully placed for maximum eversion and meticulous approximation.      Estimated blood loss was less than 10 ml for all surgical sites. A sterile pressure dressing was applied and wound care instructions, with a written handout, were given. The patient was discharged from the Dermatologic Surgery Center alert and ambulatory.    The patient elected for pathology results to automatically release and understands that the clinical staff will contact them as soon as possible to notify them of the results.    Repair Size: 8.6 cm    The wound was cleansed with saline and ointment was applied along the wound surface.     A sterile pressure dressing was applied.  Wound care instructions were given verbally and in writing.  The patient left the operating suite in stable condition.  Patient was informed that additional refinement of the resulting surgical scar may be used as a second stage of this reconstruction.     Dr Velazquez performed the Mohs, Dr Peralta the repair    The attending surgeon was present for key portions of the above major procedure and examination.    Dermatology Procedure Note 2: Excision intermediate layered linear closure    NAME OF PROCEDURE: Excision intermediate layered linear closure  Staff surgeon: Jean Carlos Peralta MD  Resident/Fellow: Tru Santiago MD    PRE-OPERATIVE DIAGNOSIS:  Melanoma in situ  POST-OPERATIVE DIAGNOSIS: Same   LOCATION: Left upper paraspinal back  FINAL EXCISION SIZE(DEFECT SIZE): 3 x 2.4  cm  MARGIN: 0.5 cm  FINAL REPAIR LENGTH: 5.2 cm   ANESTHESIA: 2cc 1% lidocaine with 1:100,000 epinephrine    INDICATIONS: This patient presented with a 2 x 1.4 cm Melanoma in situ. Excision was indicated. We discussed the principles of treatment and most likely complications including scarring, bleeding, infection, incomplete excision, wound dehiscence, pain, nerve damage, and recurrence. Informed consent was obtained and the patient underwent the procedure as follows:    PROCEDURE: The patient was taken to the operative suite. Time-out was performed.  The treatment area was anesthetized with 1% lidocaine with epinephrine. The area was prepped with Chlorhexidine and rinsed with sterile saline and draped with sterile towels. The lesion was delineated and excised down to subcutaneous fat in a elliptical manner. Hemostasis was obtained by electrocoagulation.     REPAIR: An intermediate layered linear closure was selected as the procedure which would maximally preserve both function and cosmesis.    After the excision of the tumor, the area was carefully undermined. Hemostasis was obtained with electrocoagulation.  Closure was oriented so that the wound was in the patient's natural skin tension lines. The subcutaneous and dermal layers were then closed with 3-0 vicryl sutures. The epidermis was then carefully approximated along the length of the wound using 4-0 monocryl running subcuticular sutures.     Estimated blood loss was less than 10 ml for all surgical sites. A sterile pressure dressing was applied and wound care instructions, with a written handout, were given. The patient was discharged from the Dermatologic Surgery Center alert and ambulatory.    The patient elected for pathology results to automatically release and understands that the clinical staff will contact them as soon as possible to notify them of the results.    Dr. Jean Carlos Peralta was immediately available for the entire surgery and was physicially  present for the key portions of the procedure.    Anatomic Pathology Results: pending    Dermatology Procedure Note 3: Electrodesiccation and Curettage    PREOPERATIVE DIAGNOSIS: BCC    POSTOPERATIVE DIAGNOSIS: same    LOCATION: Right upper back    SIZE: 2 x 1.0 cm     Treatment options including electrodessiccation and curettage (ED and C), excision and topicals were reviewed.  The expected cure rates, healing times and anticipated scars of each option were discussed and the patient elects to proceed with ED and C.     The risks and benefits of the procedure were described to the patient.  These include but are not limited to bleeding, infection, scar, incomplete removal, and recurrence. Written informed consent was obtained. Time-out was performed. The above site was cleansed with and injected with 1 mL 1% lidocaine with epinephrine. Once anesthesia was obtained, the site was prepped with Chlorhexidine and rinsed with sterile saline. The lesion was curetted with in 3 directions with a 2 mm margin and this was followed by electrodessication.  This process was repeated three times. The defect measured 2.2 x 1.2 cm. Vaseline and a bandage were applied to the wound. The patient tolerated the procedure well and was given post care instructions.    Clinical Follow-up: As scheduled with primary dermatologist.    Staff Involved:  Staff/Scribe/Fellow    Scribe Disclosure:   I, Juliann Huerta, am serving as a scribe; to document services personally performed by Jean Carlos Peralta MD, based on data collection and the provider's statements to me.     Provider Disclosure:  I agree with the above history, review of systems, physical exam and plan.  I have reviewed the content of the documentation and have edited it as needed. I have personally performed the services documented here and the documentation accurately represents those services and the decisions I have made.      Electronically signed by:  Attending attestation:  I  personally performed the entire procedure.  I have reviewed the note and edited it as necessary, and agree with its contents.    Jean Carlos Peralta M.D.  Professor  Director of Dermatologic Surgery  Department of Dermatology  [facility]    Dermatology Surgery Clinic  [place] [id number]

## 2025-01-20 NOTE — TELEPHONE ENCOUNTER
Follow up call attempted & left voicemail following Mohs procedure with Dr. Peralta.       Are you having pain?   Are you taking pain medication?   Are you applying ice?    Have you had any noticeable bleeding through the bandage?    Do you have any other concerns?        Please call [phone] if you have any questions or concerns during business hours. For concerns after hours, call the hospital  to reach the dermatology resident on call at [phone], option 4.     Alexandria FINLEY RN  Dermatology Surgery

## 2025-02-20 ENCOUNTER — OFFICE VISIT (OUTPATIENT)
Dept: DERMATOLOGY | Facility: CLINIC | Age: 65
End: 2025-02-20
Attending: DERMATOLOGY
Payer: COMMERCIAL

## 2025-02-20 VITALS — SYSTOLIC BLOOD PRESSURE: 156 MMHG | DIASTOLIC BLOOD PRESSURE: 96 MMHG | HEART RATE: 78 BPM

## 2025-02-20 DIAGNOSIS — G89.18 POSTOPERATIVE PAIN: ICD-10-CM

## 2025-02-20 DIAGNOSIS — C44.311 BASAL CELL CARCINOMA (BCC) OF SKIN OF NOSE: Primary | ICD-10-CM

## 2025-02-20 PROCEDURE — 15260 FTH/GFT FR N/E/E/L 20 SQCM/<: CPT | Mod: GC | Performed by: DERMATOLOGY

## 2025-02-20 PROCEDURE — 17311 MOHS 1 STAGE H/N/HF/G: CPT | Mod: GC | Performed by: DERMATOLOGY

## 2025-02-20 PROCEDURE — 17312 MOHS ADDL STAGE: CPT | Mod: GC | Performed by: DERMATOLOGY

## 2025-02-20 PROCEDURE — 14061 TIS TRNFR E/N/E/L10.1-30SQCM: CPT | Mod: GC | Performed by: DERMATOLOGY

## 2025-02-20 RX ORDER — ACETAMINOPHEN 500 MG
1000 TABLET ORAL ONCE
Status: COMPLETED | OUTPATIENT
Start: 2025-02-20 | End: 2025-02-20

## 2025-02-20 RX ORDER — TRANEXAMIC ACID 100 MG/ML
200 INJECTION, SOLUTION INTRAVENOUS ONCE
Status: COMPLETED | OUTPATIENT
Start: 2025-02-20 | End: 2025-02-24

## 2025-02-20 RX ADMIN — Medication 1000 MG: at 12:47

## 2025-02-20 ASSESSMENT — PAIN SCALES - GENERAL: PAINLEVEL_OUTOF10: NO PAIN (0)

## 2025-02-20 NOTE — NURSING NOTE
Drug Administration Record    Prior to injection, verified patient identity using patient's name and date of birth.  Due to injection administration, patient instructed to remain in clinic for 15 minutes  afterwards, and to report any adverse reaction to me immediately.    Drug Name: Bupivacaine  Dose: 3 mL  Route administered: ID  NDC #: 86116-416-10  Amount of waste(mL):7 mL's  Reason for waste: Multi dose vial    LOT #: [id number]  SITE: See provider note  : [employer]  EXPIRATION DATE: 2028/02    Colten Soriano, EMT  Clinic Support  [facility]     [phone]    Employed by [employer]

## 2025-02-20 NOTE — PATIENT INSTRUCTIONS
Wound care    I will experience scar, bleeding, swelling, pain, crusting and redness. I may experience incomplete removal or recurrence. Risks are bleeding, bruising, swelling, infection, nerve damage, & a large wound. A second procedure may be recommended to obtain the best cosmetic or functional result.       A three month office visit with your Surgeon is recommended for scar evaluation. Please reach out sooner if you have concerns about you surgical site/wound.    Caring for your skin after surgery    After your surgery, a pressure bandage will be placed over the area that has stitches. This is important to prevent bleeding. Please follow these instructions over the next 1 to 2 weeks. Following this regimen will help to prevent complications as your wound heals.     For the first 48 hours after your surgery:    Leave the pressure dressing on and keep it dry. If it should come loose, you may re-tape it, but do not take it off.  Relax and take it easy. Do not do any vigorous exercise or heavy lifting. This could cause the wound to bleed.  Post-Operative pain is usually mild. If you are able to take tylenol, You may take plain or extra-strength Tylenol (acetaminophen) As directed on the bottle (do not take more than 4,000mg in one day). If you are able to take ibuprofen, you can alternate the tylenol and ibuprofen.   Avoid alcohol as this may increase your tendency to bleed.   You may put an ice pack around the bandaged area for 20 minutes at a time as needed. This may help reduce swelling, bruising, and pain. Make sure the ice pack is waterproof so that the pressure bandage doesn t get wet.  If the wound is on the face try to sleep with your head elevated. Either in a recliner or propped up in bed, this will decrease swelling around the eyes.   You may see a small amount of drainage or blood on your pressure bandage. This is normal. However:  If drainage or bleeding continues or saturates the bandage, you will  need to apply firm pressure over the bandage with a piece of gauze for 15 minutes.  If bleeding continues after applying pressure for 15 minutes, apply an ice pack to the bandaged area for 15 minutes.  If bleeding still continues, call our office or go to the nearest emergency room.    Remove pressure dressing 48 hours after surgery:    Carefully remove the pressure bandage. If it seems sticky or too difficult to get off, you may need to soak it off in the shower.  After the pressure dressing is removed, you may shower and get the wound wet. However, Do Not let the forceful stream of the shower hit the wound directly.  Follow these wound care and dressing change instructions:    You have skin glue over the stitches. This will dry and flake off with time (about 2 weeks). If the stitches are still hanging around after 2 weeks, let us know, we can clip them out for you if they do not fall out with washing.   You may allow water to run over the site. Do not soak.  Do Not rub or scrub the site.  Pat dry after the shower or bath.  Avoid topical medications, lotions, creams, ointment,or oils.  Do not use tanning lamps or expose the site to sun.   Check wound appearance daily, some swelling and redness is normal after a procedure but should go away as your would is healing. If the swelling and redness or pain increases or if any other signs of infection occur listed below please send in a photo via my chart and or call us to let us know.  The clear glue film should start peeling and flaking off approximately around 2 weeks. By this time your wound should be sufficiently healed. If it still looks to be healing when the glue comes off you can clean the wound with soapy warm water daily in the shower. No need to bandage further, but you can put a bandage on the area daily for the two weeks if you would like.   If skin glue and sutures are still intact at 2 weeks after your procedure, you can start applying Vaseline daily to  "help soften up the skin glue. It will come off easier this way.        If you are able to take acetaminophen (\"Tylenol,\" etc.) and ibuprofen (\"Advil\" or \"Motrin,\" etc.), then you may STAGGER these medications by taking 400 mg of ibuprofen (usually two tabs) every 8 hours and 1,000 mg of acetaminophen (e.g., two tabs of extra-strength Tylenol) every 8 hours.    This means, for example, that you could take the followin,000 mg of Tylenol, followed 4 hours later by 400 mg Ibuprofen, followed 4 hours later with 1,000 mg of Tylenol, followed 4 hours later by 400 mg Ibuprofen, followed 4 hours later with 1,000 mg of Tylenol, and so forth.     Essentially, you can take either 1,000 mg of Tylenol or 400 mg of ibuprofen in alternating fashion EVERY FOUR HOURS.    Do NOT exceed more than 4,000 mg of Tylenol or 3,200 mg of ibuprofen per 24 hours. If you are not able to take Tylenol or ibuprofen as above due to other health issues (or a physician has told you directly that you are not allowed to take one of them, say due to pre-existing severe liver or kidney issues), then disregard the above directions.    Scientific evidence supports that this combination/schedule of pain medications is just as effective, if not more effective, than taking a narcotic pain medicine.       Follow up will be a 3 month scar evaluation either in person or via a telephone visit with you sending in a photo via "[employer] Unless you have been told to follow up sooner or if you have concerns and would like to be see sooner. Please call or send us in a "[employer]" message if possible and attach a photo.        What to expect:    The first couple of days your wound may be tender and may bleed slightly when doing wound care.  There may be swelling and bruising around the wound, especially if it is near the eyes. For your comfort, you may apply ice or cold compresses to the bruises after your have removed the pressure bandage.  The area around your wound may " be numb for several weeks or even months.  You may experience periodic sharp pain or mild itching around the wound as it heals.   The suture line will look dark for a while but will lighten over time.       When to call us:    You have bleeding that will not stop after applying pressure and ice.  You have pain that is not controlled with Tylenol (acetaminophen.)  You have signs or symptoms of an infection such as:  Fever over 100 degrees Fahrenheit  Redness, warmth or foul-smelling drainage from the wound  If you have any questions, or are not sure how to take care of the wound.    Phone numbers:    During business hours (M-F 8:00-4:30 p.m.)  [facility]-  [phone] Option 1 appt. Desk and ask for the Dermatology Surgery Team  [phone] [facility] .     ---------------------------------------------------------  Evenings/Weekends/Holidays  Hospital - [phone]   TTY for hearing knewmink-[phone]  *Ask  to page dermatologist on-call  Emergency Vkno-[phone]  TTY for hearing impaired- [phone]

## 2025-02-20 NOTE — NURSING NOTE
Dermatology Rooming Note    Cornelius Ruiz's goals for this visit include:   Chief Complaint   Patient presents with    Mohs     Here for MOHS surgery.      Colten Soriano, EMT  Clinic Support  [facility]     [phone]    Employed by [facility]

## 2025-02-20 NOTE — NURSING NOTE
Drug Administration Record    Prior to injection, verified patient identity using patient's name and date of birth.  Due to injection administration, patient instructed to remain in clinic for 15 minutes  afterwards, and to report any adverse reaction to me immediately.    Drug Name: Tranexamic acid (100mg/mL)  Dose: 5mL  Route administered: SQ  NDC #: 8410301852  Amount of waste(mL):5mL  Reason for waste: Single use vial    LOT #: [id number]  SITE: nose  : [employer]   EXPIRATION DATE: 05/27

## 2025-02-20 NOTE — LETTER
2/20/2025       RE: Cornelius Ruiz  [address]  [place] [address]     Dear Colleague,    Thank you for referring your patient, Cornelius Ruiz, to the [facility] [place] at [facility]. Please see a copy of my visit note below.    [facility] [place] Procedure Note    Dermatology Problem List:  # LTM - nasal tip - resolved 12/9/2024 s/p Efudex- Dovonex   1. Melanoma in situ  - MIS/LM, left upper paraspinal back, s/p WLE 1/20/2025  - MIS, left back, s/p bx 4/1/22, s/p excision  2. NMSC  - niacinamide 500 mg po BID  - snBCC, right nostril, s/p bx 01/20/25, s/p MMS 02/20/25  - SCC, right chest, s/p MMS 01/20/25  - BCC, right upper back, s/p ED&C 01/20/25  - SCC, right sideburn, s/p bx 4/1/22, s/p MMS 4/26/22  - SCC, right lateral cheek, s/p bx 4/1/22, s/p MMS 4/26/22  - BCC, right upper chest, s/p bx 4/1/22, s/p excision 05/2022  - BCC, left upper chest, s/p bx 4/1/22, s/p excision 05/2022  - 11/18/22 SCC R anterior shoulder  - 11/18/22 nBCC R mid cheek s/p mohs  - 11/18/22 SCC L lateral cheek s/p mohs  - 11/18/22 MM Mid paraspinal back 0.5mm BD s/p WLE  - 11/18/22 SCCIS L scapular back s/p mohs  - 11/18/22 SCCIS R medial clavicle  s/p mohs  3. 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 Cellulitis, chest  - Doxycycline 100 mg BID x10 days initiated 5/25/2022  5. Diffuse actinic damage   - Efudex/calcipotriene cream (initiaited to face 11/18/2022)  - previously completed efudex alone on the chest with moderate to good response  - future: consider PDT    Date of Service:  Feb 20, 2025  Surgery: Mohs micrographic surgery    Case 1  Repair Type: Nasalis Sling with FTSG/Burow's Graft  Repair Size: 4.9 x 2.8 cm (flap), 1.3 x 0.8 cm (graft)  Suture Material: 4-0 Monocryl, 5-0 Monocryl, 5-0 Vicryl Rapide  Tumor Type: BCC - Basal cell carcinoma  Location: Right nostril  Derm-Path Accession #:  [id number]  PreOp Size: 1.8 x 1.3 cm  PostOp Size: 2.0 x 2.2 cm  Mohs Accession #:   Level of Defect: cartilage      Procedure:  We discussed the principles of treatment and most likely complications including scarring, bleeding, infection, swelling, pain, crusting, nerve damage, large wound,  incomplete excision, wound dehiscence,  nerve damage, recurrence, and a second procedure may be recommended to obtain the best cosmetic or functional result.    Informed consent was obtained and the patient underwent the procedure as follows:  The patient was placed supine on the operating table.  The cancer was identified, outlined with a marker, and verified by the patient.  The entire surgical field was prepped with Hibiclens.  The surgical site was anesthetized using Lidocaine 1% with epi 1:100,000.      The area of clinically apparent tumor was debulked. The layer of tissue was then surgically excised using a #15 blade and was then transferred onto a specimen sheet maintaining the orientation of the specimen. Hemostasis was obtained using heat cautery. The wound site was then covered with a dressing while the tissue samples were processed for examination.    The excised tissue was transported to the Mohs histology laboratory maintaining the tissue orientation.  The tissue specimen was relaxed so that the entire surgical margin was in a a single horizontal plane for sectioning and inked for precise mapping.  A precise reference map was drawn to reflect the sectioning of the specimen, colored inking of the margins, and orientation on the patient.  The tissue was processed using horizontal sectioning of the base and continuous peripheral margins.  The histopathologic sections were reviewed in conjunction with the reference map.    Total blocks: 1    Total slides:  3    Residual tumor was identified and indicated in red on the reference map, identifying the location where further tissue excision was necessary.  Therefore, an additional stage of Mohs Micrographic surgery was deemed necessary.     Stage II   The patient was returned to the operating room, and the area prepped in the usual manner. The residual tumor was excised using the reference map as a guide. The specimen was transfered to a labeled specimen sheet maintaining the orientation of the specimen. Hemostasis was obtained and the wound site was covered with a dressing while the tissue was processed for examination.     The excised tissue was transported to the Mohs histology laboratory maintaining orientation. The specimen margins were inked for precise mapping and a reference map was prepared for the is additional stage to maintain precise orientation as described above. The tissue was processed using horizontal sectioning of the base and continuous peripheral margins. The histopathologic sections were reviewed in conjunction with the reference map.     Total blocks: 1    Total slides:  2    Residual tumor was identified and indicated in red on the reference map, identifying the location where further tissue excision was necessary. Therefore, an additional stage of Mohs Micrographic surgery was deemed necessary.     Stage III   The patient was returned to the operating room, and the area prepped in the usual manner. The residual tumor was excised using the reference map as a guide. The specimen was transfered to a labeled specimen sheet maintaining the orientation of the specimen. Hemostasis was obtained and the wound site was covered with a dressing while the tissue was processed for examination.     The excised tissue was transported to the Mohs histology laboratory maintaining orientation. The specimen margins were inked for precise mapping and a reference map was prepared for the is additional stage to maintain precise orientation as described above. The tissue was processed using horizontal sectioning of the base and continuous peripheral margins. The  histopathologic sections were reviewed in conjunction with the reference map.     Total blocks: 1    Total slides:  2    There were no cancer cells visualized on examination, therefore Mohs surgery was complete.     RECONSTRUCTION: Island Pedicle Flap (Nasal sling flap) and Burows Full Thickness Skin Graft    PROCEDURE:  The patient was taken to the operative suite and placed supine on the operating room table. The wound was identified and the planned Nasalis sling flap island pedicle reconstruction was outlined with a marker. The area was then infiltrated with 1% lidocaine with epinephrine. The area was then prepped in a sterile fashion using Hibiclens and rinsed with sterile saline and sterile drapes were placed. The wound was debeveled and undermined within the supraperichondrial plane. The flap was incised with a #15 scalpel, down to the perichondrium over the medial and inferior aspect of the flap, and superficially over the lateral aspect. Laterally, bilevel undermining was then performed, with planes both above and below nasalis muscle fibers. The nasalis musculocutaneous pedicle was narrowed and released thoughtfully until appropriate movement was obtained; an appropriately sized pedical remained at the completion of this step (>1/3 of lateral margin). A full thickness Burow's graft was obtained from a cone at the superior aspect of the nasalis sling flap. Hemostasis was obtained with heat cautery. The flap was then advanced into the defect and secured with 4-0 and 5-0 Monocryl sutures. The wound edges were then carefully approximated using 5-0 Vicryl Rapide epidermal sutures.     The remaining defect over the columella was repaired with a Burows wedge full thickness skin graft using the previously excised cone of redundant skin.  The 1.3 x 0.8 cm graft was trimmed to fit the remaining defect and thinned, removing all subcutaneous fat. The graft was placed onto the recipient site and meticulously secured  with 5-0 Vicryl Rapide sutures. A quilting suture was placed in the center of the graft to minimize dead space between the graft and wound base..    The wound was cleansed with saline, and ointment was applied along the wound surface.  A sterile pressure of non-adherent gauze was applied and wound care instructions were given verbally and in writing. The patient will return in 2 weeks for wound check.  The patient left the operating suite in stable condition.      The attending surgeon was present  for the entire procedure, key portions of the reconstruction and always immediately available.     PostOp Size: 2.0 x 2.2 cm  Repair size: 4.9 x 2.8 cm (flap), 1.3 x 0.8 cm (graft)  Suture Material: 4-0 Monocryl, 5-0 Monocryl, 5-0 Vicryl Rapide    The attending surgeon was present  for the entire procedure, key portions of the reconstruction and always immediately available. .    Staff Involved:  Staff/Scribe/Fellow    Scribe Disclosure:   I, Juliann [facility], am serving as a scribe; to document services personally performed by Jean Carlos Peralta MD, based on data collection and the provider's statements to me.     Provider Disclosure:  I agree with the above history, review of systems, physical exam and plan.  I have reviewed the content of the documentation and have edited it as needed. I have personally performed the services documented here and the documentation accurately represents those services and the decisions I have made.      Electronically signed by:  Joanna Tamayo MD  Mohs Micrographic Surgery and Dermatologic Oncology Fellow  [facility]    Attestation signed by Jean Carlos Peralta MD at 2/25/2025  1:51 PM:    Attending attestation:  I was present for key elements of the procedure and immediately available for all other portions of the procedure.  I have reviewed the note and edited it as necessary.    Jean Carlos Peralta M.D.  Professor  Director of Dermatologic Surgery  Department of Dermatology  [facility]  [place]    Dermatology Surgery Clinic  [facility]  [address] [place], [place] [id number]      Again, thank you for allowing me to participate in the care of your patient.      Sincerely,    Jean Carlos Peralta MD

## 2025-02-20 NOTE — NURSING NOTE
Drug Administration Record    Prior to injection, verified patient identity using patient's name and date of birth.  Due to injection administration, patient instructed to remain in clinic for 15 minutes  afterwards, and to report any adverse reaction to me immediately.    Drug Name: tylenol   Dose: 1000mg  Route administered: PO  NDC #: 0064144021  Amount of waste(mL):NA  Reason for waste: single pack    LOT #: [id number]  SITE: NA  : [employer]  EXPIRATION DATE: 01/2026

## 2025-02-20 NOTE — PROGRESS NOTES
[facility] Dermatologic Surgery Clinic [place] Procedure Note    Dermatology Problem List:  # LTM - nasal tip - resolved 12/9/2024 s/p Efudex- Dovonex   1. Melanoma in situ  - MIS/LM, left upper paraspinal back, s/p WLE 1/20/2025  - MIS, left back, s/p bx 4/1/22, s/p excision  2. NMSC  - niacinamide 500 mg po BID  - snBCC, right nostril, s/p bx 01/20/25, s/p MMS 02/20/25  - SCC, right chest, s/p MMS 01/20/25  - BCC, right upper back, s/p ED&C 01/20/25  - SCC, right sideburn, s/p bx 4/1/22, s/p MMS 4/26/22  - SCC, right lateral cheek, s/p bx 4/1/22, s/p MMS 4/26/22  - BCC, right upper chest, s/p bx 4/1/22, s/p excision 05/2022  - BCC, left upper chest, s/p bx 4/1/22, s/p excision 05/2022  - 11/18/22 SCC R anterior shoulder  - 11/18/22 nBCC R mid cheek s/p mohs  - 11/18/22 SCC L lateral cheek s/p mohs  - 11/18/22 MM Mid paraspinal back 0.5mm BD s/p WLE  - 11/18/22 SCCIS L scapular back s/p mohs  - 11/18/22 SCCIS R medial clavicle  s/p mohs  3. 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 Cellulitis, chest  - Doxycycline 100 mg BID x10 days initiated 5/25/2022  5. Diffuse actinic damage   - Efudex/calcipotriene cream (initiaited to face 11/18/2022)  - previously completed efudex alone on the chest with moderate to good response  - future: consider PDT    Date of Service:  Feb 20, 2025  Surgery: Mohs micrographic surgery    Case 1  Repair Type: Nasalis Sling with FTSG/Burow's Graft  Repair Size: 4.9 x 2.8 cm (flap), 1.3 x 0.8 cm (graft)  Suture Material: 4-0 Monocryl, 5-0 Monocryl, 5-0 Vicryl Rapide  Tumor Type: BCC - Basal cell carcinoma  Location: Right nostril  Derm-Path Accession #: [id number]  PreOp Size: 1.8 x 1.3 cm  PostOp Size: 2.0 x 2.2 cm  Mohs Accession #:   Level of Defect: cartilage      Procedure:  We discussed the principles of treatment and most likely complications including scarring, bleeding, infection, swelling, pain, crusting, nerve damage, large wound,  incomplete excision, wound  dehiscence,  nerve damage, recurrence, and a second procedure may be recommended to obtain the best cosmetic or functional result.    Informed consent was obtained and the patient underwent the procedure as follows:  The patient was placed supine on the operating table.  The cancer was identified, outlined with a marker, and verified by the patient.  The entire surgical field was prepped with Hibiclens.  The surgical site was anesthetized using Lidocaine 1% with epi 1:100,000.      The area of clinically apparent tumor was debulked. The layer of tissue was then surgically excised using a #15 blade and was then transferred onto a specimen sheet maintaining the orientation of the specimen. Hemostasis was obtained using heat cautery. The wound site was then covered with a dressing while the tissue samples were processed for examination.    The excised tissue was transported to the Mohs histology laboratory maintaining the tissue orientation.  The tissue specimen was relaxed so that the entire surgical margin was in a a single horizontal plane for sectioning and inked for precise mapping.  A precise reference map was drawn to reflect the sectioning of the specimen, colored inking of the margins, and orientation on the patient.  The tissue was processed using horizontal sectioning of the base and continuous peripheral margins.  The histopathologic sections were reviewed in conjunction with the reference map.    Total blocks: 1    Total slides:  3    Residual tumor was identified and indicated in red on the reference map, identifying the location where further tissue excision was necessary. Therefore, an additional stage of Mohs Micrographic surgery was deemed necessary.     Stage II   The patient was returned to the operating room, and the area prepped in the usual manner. The residual tumor was excised using the reference map as a guide. The specimen was transfered to a labeled specimen sheet maintaining the orientation  of the specimen. Hemostasis was obtained and the wound site was covered with a dressing while the tissue was processed for examination.     The excised tissue was transported to the Mohs histology laboratory maintaining orientation. The specimen margins were inked for precise mapping and a reference map was prepared for the is additional stage to maintain precise orientation as described above. The tissue was processed using horizontal sectioning of the base and continuous peripheral margins. The histopathologic sections were reviewed in conjunction with the reference map.     Total blocks: 1    Total slides:  2    Residual tumor was identified and indicated in red on the reference map, identifying the location where further tissue excision was necessary. Therefore, an additional stage of Mohs Micrographic surgery was deemed necessary.     Stage III   The patient was returned to the operating room, and the area prepped in the usual manner. The residual tumor was excised using the reference map as a guide. The specimen was transfered to a labeled specimen sheet maintaining the orientation of the specimen. Hemostasis was obtained and the wound site was covered with a dressing while the tissue was processed for examination.     The excised tissue was transported to the Mohs histology laboratory maintaining orientation. The specimen margins were inked for precise mapping and a reference map was prepared for the is additional stage to maintain precise orientation as described above. The tissue was processed using horizontal sectioning of the base and continuous peripheral margins. The histopathologic sections were reviewed in conjunction with the reference map.     Total blocks: 1    Total slides:  2    There were no cancer cells visualized on examination, therefore Mohs surgery was complete.     RECONSTRUCTION: Island Pedicle Flap (Nasal sling flap) and Burows Full Thickness Skin Graft    PROCEDURE:  The patient was  taken to the operative suite and placed supine on the operating room table. The wound was identified and the planned Nasalis sling flap island pedicle reconstruction was outlined with a marker. The area was then infiltrated with 1% lidocaine with epinephrine. The area was then prepped in a sterile fashion using Hibiclens and rinsed with sterile saline and sterile drapes were placed. The wound was debeveled and undermined within the supraperichondrial plane. The flap was incised with a #15 scalpel, down to the perichondrium over the medial and inferior aspect of the flap, and superficially over the lateral aspect. Laterally, bilevel undermining was then performed, with planes both above and below nasalis muscle fibers. The nasalis musculocutaneous pedicle was narrowed and released thoughtfully until appropriate movement was obtained; an appropriately sized pedical remained at the completion of this step (>1/3 of lateral margin). A full thickness Burow's graft was obtained from a cone at the superior aspect of the nasalis sling flap. Hemostasis was obtained with heat cautery. The flap was then advanced into the defect and secured with 4-0 and 5-0 Monocryl sutures. The wound edges were then carefully approximated using 5-0 Vicryl Rapide epidermal sutures.     The remaining defect over the columella was repaired with a Burows wedge full thickness skin graft using the previously excised cone of redundant skin.  The 1.3 x 0.8 cm graft was trimmed to fit the remaining defect and thinned, removing all subcutaneous fat. The graft was placed onto the recipient site and meticulously secured with 5-0 Vicryl Rapide sutures. A quilting suture was placed in the center of the graft to minimize dead space between the graft and wound base..    The wound was cleansed with saline, and ointment was applied along the wound surface.  A sterile pressure of non-adherent gauze was applied and wound care instructions were given verbally and  in writing. The patient will return in 2 weeks for wound check.  The patient left the operating suite in stable condition.      The attending surgeon was present  for the entire procedure, key portions of the reconstruction and always immediately available.     PostOp Size: 2.0 x 2.2 cm  Repair size: 4.9 x 2.8 cm (flap), 1.3 x 0.8 cm (graft)  Suture Material: 4-0 Monocryl, 5-0 Monocryl, 5-0 Vicryl Rapide    The attending surgeon was present  for the entire procedure, key portions of the reconstruction and always immediately available. .    Staff Involved:  Staff/Scribe/Fellow    Scribe Disclosure:   I, Juliann Huerta, am serving as a scribe; to document services personally performed by Jean Carlos Peralta MD, based on data collection and the provider's statements to me.     Provider Disclosure:  I agree with the above history, review of systems, physical exam and plan.  I have reviewed the content of the documentation and have edited it as needed. I have personally performed the services documented here and the documentation accurately represents those services and the decisions I have made.      Electronically signed by:  Joanna Tamayo MD  Mohs Micrographic Surgery and Dermatologic Oncology Fellow  [facility]

## 2025-02-24 RX ADMIN — TRANEXAMIC ACID 200 MG: 100 INJECTION, SOLUTION INTRAVENOUS at 12:00

## 2025-03-06 ENCOUNTER — ALLIED HEALTH/NURSE VISIT (OUTPATIENT)
Dept: DERMATOLOGY | Facility: CLINIC | Age: 65
End: 2025-03-06
Payer: COMMERCIAL

## 2025-03-06 ENCOUNTER — OFFICE VISIT (OUTPATIENT)
Dept: DERMATOLOGY | Facility: CLINIC | Age: 65
End: 2025-03-06
Payer: COMMERCIAL

## 2025-03-06 DIAGNOSIS — L90.5 POSTOPERATIVE SCAR: ICD-10-CM

## 2025-03-06 DIAGNOSIS — C44.311 BASAL CELL CARCINOMA (BCC) OF SKIN OF NOSE: Primary | ICD-10-CM

## 2025-03-06 DIAGNOSIS — Z48.89 ENCOUNTER FOR POSTOPERATIVE WOUND CHECK: Primary | ICD-10-CM

## 2025-03-06 DIAGNOSIS — Z98.890 POSTOPERATIVE SCAR: ICD-10-CM

## 2025-03-06 NOTE — PROGRESS NOTES
"Mohs surgery clinic note    S: Cornelius Ruiz is a 64 year old male who is status post Mohs surgery of a basal cell carcinoma on the \"right nostril\" with nasalis sling and FTSG/Burow's graft repair on 02/20/2025. He presents today for follow-up wound check.    He reports postop pain in the few days immediately postop, which has improved significantly by now. He denies any current issues with the site.    O: Healing geometric suture lines on the nasal dorsum, sidewall, tip, columella, and soft triangles with some yellow fibrinous debris. Left side of nasalis sling has an adherent black crust, of which the edges peel up easily. No signs of infection.    A: Cornelius Ruiz is 2 weeks status post Mohs with nasalis sling and FTSG/Burow's graft repair, which appears to be healing well with no signs of infection. There is a black crust present, favored to be residual Dermabond and potentially some superficial necrosis.    P: A small amount of the overlying black crust was easily lifted with forceps and trimmed with scissors during examination today. The majority was left in place. Photograph(s) obtained and added to medical record (Media tab) with patient's verbal consent. Recommended that patient continue daily wound care, washing with gentle soap and water, then covering with Vaseline and a bandage.    Patient will return to clinic for another wound check in 1 week or sooner if there are further concerns.    Joanna Tamayo MD  Mohs Micrographic Surgery and Dermatologic Oncology Fellow  [facility]  "

## 2025-03-06 NOTE — LETTER
3/6/2025       RE: Cornelius Ruiz  [address]  [place] [place] [facility] [address]     Dear Colleague,    Thank you for referring your patient, Cornelius Ruiz, to the [facility] DERMATOLOGIC SURGERY CLINIC [place] at [facility]. Please see a copy of my visit note below.    No notes on file    Again, thank you for allowing me to participate in the care of your patient.      Sincerely,    Jean Carlos Peralta MD         Sincerely,    Jean Carlos Peralta MD

## 2025-03-06 NOTE — NURSING NOTE
Chief Complaint   Patient presents with    RECHECK     Suture removal nose      Cornelius Ruiz comes into clinic today at the request of Dr. Peralta Ordering Provider for wound check.    Sutures removed. Left side of site appears to have necrosed, right side appears to be healing well. Will notified Dr. Peralta and [place] for further instruction. Fellow to assess today    This service provided today was under the supervising provider of the day Dr. peralta, who was available if needed.    TAMARA Motley RN-BSN  Dermatology Surgery

## 2025-03-10 ENCOUNTER — OFFICE VISIT (OUTPATIENT)
Dept: DERMATOLOGY | Facility: CLINIC | Age: 65
End: 2025-03-10
Payer: COMMERCIAL

## 2025-03-10 DIAGNOSIS — D49.2 NEOPLASM OF UNSPECIFIED BEHAVIOR OF BONE, SOFT TISSUE, AND SKIN: ICD-10-CM

## 2025-03-10 DIAGNOSIS — C44.712 BASAL CELL CARCINOMA (BCC) OF RIGHT LOWER LEG: ICD-10-CM

## 2025-03-10 DIAGNOSIS — Z85.828 HISTORY OF NONMELANOMA SKIN CANCER: ICD-10-CM

## 2025-03-10 DIAGNOSIS — Z48.89 ENCOUNTER FOR POSTOPERATIVE CARE: Primary | ICD-10-CM

## 2025-03-10 DIAGNOSIS — Z86.006 HISTORY OF MELANOMA IN SITU: ICD-10-CM

## 2025-03-10 PROCEDURE — 88305 TISSUE EXAM BY PATHOLOGIST: CPT | Mod: TC | Performed by: DERMATOLOGY

## 2025-03-10 ASSESSMENT — PAIN SCALES - GENERAL: PAINLEVEL_OUTOF10: MILD PAIN (3)

## 2025-03-10 NOTE — LETTER
3/10/2025       RE: Cornelius Ruiz  [address]  [place] [address]     Dear Colleague,    Thank you for referring your patient, Cornelius Ruiz, to the [facility] DERMATOLOGIC SURGERY CLINIC [place] at [facility]. Please see a copy of my visit note below.    Dermatologic Surgery Post-Op Wound Check     CC: Follow Up (Patient is here today for follow up on the nose. )      Dermatology Problem List:  # LTM - nasal tip - resolved 12/9/2024 s/p Efudex- Dovonex   1. Melanoma in situ  - MIS/LM, left upper paraspinal back, s/p WLE 1/20/2025  - MIS, left back, s/p bx 4/1/22, s/p excision  2. NMSC  - niacinamide 500 mg po BID  - snBCC, right nostril, s/p bx 01/20/25, s/p MMS 02/20/25  - SCC, right chest, s/p MMS 01/20/25  - BCC, right upper back, s/p ED&C 01/20/25  - SCC, right sideburn, s/p bx 4/1/22, s/p MMS 4/26/22  - SCC, right lateral cheek, s/p bx 4/1/22, s/p MMS 4/26/22  - BCC, right upper chest, s/p bx 4/1/22, s/p excision 05/2022  - BCC, left upper chest, s/p bx 4/1/22, s/p excision 05/2022  - 11/18/22 SCC R anterior shoulder  - 11/18/22 nBCC R mid cheek s/p mohs  - 11/18/22 SCC L lateral cheek s/p mohs  - 11/18/22 MM Mid paraspinal back 0.5mm BD s/p WLE  - 11/18/22 SCCIS L scapular back s/p mohs  - 11/18/22 SCCIS R medial clavicle  s/p mohs  3. 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 Cellulitis, chest  - Doxycycline 100 mg BID x10 days initiated 5/25/2022  5. Diffuse actinic damage   - Efudex/calcipotriene cream (initiaited to face 11/18/2022)  - previously completed efudex alone on the chest with moderate to good response  - future: consider PDT    Subjective: Cornelius Ruiz is a 64 year old male who presents today for wound check after 02/20/25 MMS with nasalis sling with FTSG/Burow's graft reconstruction of BCC on the right nostril.   - Today, pt reports soreness at the site of the wound and into the nostrils (2 or 3 out of 10 on pain  scale). He has been applying Vaseline to the wound daily.   - no other concerns today    Objective: An exam of the right nostril was performed today.  - Right nostril: Slight superficial necrosis along lateral and inferior border of scar along with a larger divot. No signs of cellulitis or other complications.  - Right temple: erythematous papule with telangiectasia.  - Right posterior calf: ulcerated erythematous plaque.  - The surgical site noted above is clean, dry, and intact. There is no surrounding erythema, purulence, or significant tenderness to palpation. No clinical evidence of infection noted today.      Assessment and Plan:     1. S/p 02/20/25 MMS with nasalis sling with FTSG/Burow's graft reconstruction of BCC on the right nostril.  - The patient's surgery site(s) is/are healing very well. No evidence of infection on examination today.  - The patient was told to continue with wound cares until the area(s) is/are no longer crusted.  - Anticipate dermabrasion treatments in the future.  - The patient should follow up with dermatologic surgery in 2-3 weeks as scheduled, as well as continue with regular skin exams in general dermatology clinic.    2. Neoplasm of unspecified behavior of the skin (D49.2) on the right temple. The differential diagnosis includes HAK vs SCC vs NUB.   - Shave biopsy performed today (see procedure note(s) below).    3. Basal cell carcinoma on the right posterior calf.  - Scheduled ED&C procedure.    Procedures Performed:  Shave biopsy: Location(s) right temple.  After discussion of benefits and risks including but not limited to bleeding/bruising, pain/swelling, infection, scar, incomplete removal, nerve damage/numbness, recurrence, and non-diagnostic biopsy,  verbal consent and photographs were obtained. Time-out was performed. The area was cleaned with isopropyl alcohol. 0.5mL of 1% lidocaine with epinephrine was injected to obtain adequate anesthesia of each lesion. Shave biopsy  was performed. Hemostasis was achieved with aluminium chloride. Vaseline and a sterile dressing were applied. The patient tolerated the procedure and no complications were noted. The patient was provided with verbal and written post care instructions.      Patient was discussed with and evaluated by attending physician Dr. Jean Carlos Peralta and Dr. Hawk Velazquez, MDSO fellow, PGY5.    Staff Involved:  Staff/Scribe/Fellow    Scribe Disclosure:   I, Juliann Huerta, am serving as a scribe; to document services personally performed by Jean Carlos Peralta MD, based on data collection and the provider's statements to me.     Provider Disclosure:  I agree with the above history, review of systems, physical exam and plan.  I have reviewed the content of the documentation and have edited it as needed. I have personally performed the services documented here and the documentation accurately represents those services and the decisions I have made.      Electronically signed by:  Hawk Velazquez MD           Attestation signed by Jean Carlos Peralta MD at 3/11/2025  2:15 PM:  Attending Attestation  I attest that the Fellow recorded the interview and exam that I personally performed.  I have reviewed the note and edited it as necessary.    Jean Carlos Peralta M.D.  Professor  Director of Dermatologic Surgery  Department of Dermatology  [facility]        Attending attestation:  I was present for key elements of the procedure and immediately available for all other portions of the procedure.  I have reviewed the note and edited it as necessary.    Jean Carlos Peralta M.D.  Professor  Director of Dermatologic Surgery  Department of Dermatology  [facility]    Dermatology Surgery Clinic  [facility] [place] [address]      Again, thank you for allowing me to participate in the care of your patient.      Sincerely,    Jean Carlos Peralta MD

## 2025-03-10 NOTE — NURSING NOTE
Chief Complaint   Patient presents with    Follow Up     Patient is here today for follow up on the nose.      Lisa PORTER CMA

## 2025-03-10 NOTE — PROGRESS NOTES
Dermatologic Surgery Post-Op Wound Check     CC: Follow Up (Patient is here today for follow up on the nose. )      Dermatology Problem List:  # LTM - nasal tip - resolved 12/9/2024 s/p Efudex- Dovonex   1. Melanoma in situ  - MIS/LM, left upper paraspinal back, s/p WLE 1/20/2025  - MIS, left back, s/p bx 4/1/22, s/p excision  2. NMSC  - niacinamide 500 mg po BID  - snBCC, right nostril, s/p bx 01/20/25, s/p MMS 02/20/25  - SCC, right chest, s/p MMS 01/20/25  - BCC, right upper back, s/p ED&C 01/20/25  - SCC, right sideburn, s/p bx 4/1/22, s/p MMS 4/26/22  - SCC, right lateral cheek, s/p bx 4/1/22, s/p MMS 4/26/22  - BCC, right upper chest, s/p bx 4/1/22, s/p excision 05/2022  - BCC, left upper chest, s/p bx 4/1/22, s/p excision 05/2022  - 11/18/22 SCC R anterior shoulder  - 11/18/22 nBCC R mid cheek s/p mohs  - 11/18/22 SCC L lateral cheek s/p mohs  - 11/18/22 MM Mid paraspinal back 0.5mm BD s/p WLE  - 11/18/22 SCCIS L scapular back s/p mohs  - 11/18/22 SCCIS R medial clavicle  s/p mohs  3. 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 Cellulitis, chest  - Doxycycline 100 mg BID x10 days initiated 5/25/2022  5. Diffuse actinic damage   - Efudex/calcipotriene cream (initiaited to face 11/18/2022)  - previously completed efudex alone on the chest with moderate to good response  - future: consider PDT    Subjective: Cornelius Ruiz is a 64 year old male who presents today for wound check after 02/20/25 MMS with nasalis sling with FTSG/Burow's graft reconstruction of BCC on the right nostril.   - Today, pt reports soreness at the site of the wound and into the nostrils (2 or 3 out of 10 on pain scale). He has been applying Vaseline to the wound daily.   - no other concerns today    Objective: An exam of the right nostril was performed today.  - Right nostril: Slight superficial necrosis along lateral and inferior border of scar along with a larger divot. No signs of cellulitis or other complications.  - Right temple:  erythematous papule with telangiectasia.  - Right posterior calf: ulcerated erythematous plaque.  - The surgical site noted above is clean, dry, and intact. There is no surrounding erythema, purulence, or significant tenderness to palpation. No clinical evidence of infection noted today.      Assessment and Plan:     1. S/p 02/20/25 MMS with nasalis sling with FTSG/Burow's graft reconstruction of BCC on the right nostril.  - The patient's surgery site(s) is/are healing very well. No evidence of infection on examination today.  - The patient was told to continue with wound cares until the area(s) is/are no longer crusted.  - Anticipate dermabrasion treatments in the future.  - The patient should follow up with dermatologic surgery in 2-3 weeks as scheduled, as well as continue with regular skin exams in general dermatology clinic.    2. Neoplasm of unspecified behavior of the skin (D49.2) on the right temple. The differential diagnosis includes HAK vs SCC vs NUB.   - Shave biopsy performed today (see procedure note(s) below).    3. Basal cell carcinoma on the right posterior calf.  - Scheduled ED&C procedure.    Procedures Performed:  Shave biopsy: Location(s) right temple.  After discussion of benefits and risks including but not limited to bleeding/bruising, pain/swelling, infection, scar, incomplete removal, nerve damage/numbness, recurrence, and non-diagnostic biopsy,  verbal consent and photographs were obtained. Time-out was performed. The area was cleaned with isopropyl alcohol. 0.5mL of 1% lidocaine with epinephrine was injected to obtain adequate anesthesia of each lesion. Shave biopsy was performed. Hemostasis was achieved with aluminium chloride. Vaseline and a sterile dressing were applied. The patient tolerated the procedure and no complications were noted. The patient was provided with verbal and written post care instructions.      Patient was discussed with and evaluated by attending physician Dr. Evangelista  Kathryn and Dr. Hawk Velazquez, MDSO fellow, PGY5.    Staff Involved:  Staff/Scribe/Fellow    Scribe Disclosure:   I, Juliann BlockDeanna, am serving as a scribe; to document services personally performed by Jean Carlos Peralta MD, based on data collection and the provider's statements to me.     Provider Disclosure:  I agree with the above history, review of systems, physical exam and plan.  I have reviewed the content of the documentation and have edited it as needed. I have personally performed the services documented here and the documentation accurately represents those services and the decisions I have made.      Electronically signed by:  Hawk Velazquez MD

## 2025-03-13 ENCOUNTER — TELEPHONE (OUTPATIENT)
Dept: DERMATOLOGY | Facility: CLINIC | Age: 65
End: 2025-03-13
Payer: COMMERCIAL

## 2025-03-13 NOTE — TELEPHONE ENCOUNTER
Called patient to schedule surgery with Dr. Peralta    Date of Surgery: 04/29    Surgery type: Mohs    Consult scheduled: No    Has patient had mohs with us before? Yes    Additional comments: jennifer Dailey on 3/13/2025 at 9:53 AM

## 2025-03-22 DIAGNOSIS — Z85.828 HISTORY OF NONMELANOMA SKIN CANCER: ICD-10-CM

## 2025-03-23 NOTE — TELEPHONE ENCOUNTER
"niacinamide 500 MG tablet   Start: 02/29/2024   Disp-180 tablet, R-1   Take 1 tablet (500 mg) by mouth 2 times daily (with meals)     \" niacinamide 500 mg po BID \"    Jean Carlos Peralta MD  Dermatology  Lv  3/10/25  Nv  3/28/25    Refill decision: Medication unable to be refilled by RN due to: Medication not included in refill protocol policy    dosing alert    Request from pharmacy:  Requested Prescriptions   Pending Prescriptions Disp Refills    niacinamide 500 MG tablet 180 tablet 1     Sig: Take 1 tablet (500 mg) by mouth 2 times daily (with meals).       There is no refill protocol information for this order           "

## 2025-03-24 RX ORDER — NIACINAMIDE 500 MG
500 TABLET ORAL 2 TIMES DAILY WITH MEALS
Qty: 180 TABLET | Refills: 1 | Status: SHIPPED | OUTPATIENT
Start: 2025-03-24

## 2025-04-29 ENCOUNTER — OFFICE VISIT (OUTPATIENT)
Dept: DERMATOLOGY | Facility: CLINIC | Age: 65
End: 2025-04-29
Payer: COMMERCIAL

## 2025-04-29 ENCOUNTER — TELEPHONE (OUTPATIENT)
Dept: DERMATOLOGY | Facility: CLINIC | Age: 65
End: 2025-04-29

## 2025-04-29 VITALS — DIASTOLIC BLOOD PRESSURE: 92 MMHG | SYSTOLIC BLOOD PRESSURE: 137 MMHG | HEART RATE: 83 BPM

## 2025-04-29 DIAGNOSIS — C44.329 SQUAMOUS CELL CARCINOMA OF FOREHEAD: Primary | ICD-10-CM

## 2025-04-29 DIAGNOSIS — D49.2 NEOPLASM OF UNSPECIFIED BEHAVIOR OF BONE, SOFT TISSUE, AND SKIN: ICD-10-CM

## 2025-04-29 PROCEDURE — 17311 MOHS 1 STAGE H/N/HF/G: CPT | Mod: 79 | Performed by: DERMATOLOGY

## 2025-04-29 PROCEDURE — 13132 CMPLX RPR F/C/C/M/N/AX/G/H/F: CPT | Mod: 79 | Performed by: DERMATOLOGY

## 2025-04-29 ASSESSMENT — PAIN SCALES - GENERAL: PAINLEVEL_OUTOF10: NO PAIN (0)

## 2025-04-29 NOTE — TELEPHONE ENCOUNTER
Follow up call attempted & left voicemail following Mohs procedure with Dr. Peralta.       Are you having pain?   Are you taking pain medication?   Are you applying ice?    Have you had any noticeable bleeding through the bandage?    Do you have any other concerns?        Please call [phone] if you have any questions or concerns during business hours. For concerns after hours, call the hospital  to reach the dermatology resident on call at [phone], option 4.       Alexandria FINLEY RN  Dermatology Surgery

## 2025-04-29 NOTE — PATIENT INSTRUCTIONS
Wound care    I will experience scar, bleeding, swelling, pain, crusting and redness. I may experience incomplete removal or recurrence. Risks are bleeding, bruising, swelling, infection, nerve damage, & a large wound. A second procedure may be recommended to obtain the best cosmetic or functional result.       A three month office visit with your Surgeon is recommended for scar evaluation. Please reach out sooner if you have concerns about you surgical site/wound.    Caring for your skin after surgery    After your surgery, a pressure bandage will be placed over the area that has stitches. This is important to prevent bleeding. Please follow these instructions over the next 1 to 2 weeks. Following this regimen will help to prevent complications as your wound heals.     For the first 48 hours after your surgery:    Leave the pressure dressing on and keep it dry. If it should come loose, you may re-tape it, but do not take it off.  Relax and take it easy. Do not do any vigorous exercise or heavy lifting. This could cause the wound to bleed.  Post-Operative pain is usually mild. If you are able to take tylenol, You may take plain or extra-strength Tylenol (acetaminophen) As directed on the bottle (do not take more than 4,000mg in one day). If you are able to take ibuprofen, you can alternate the tylenol and ibuprofen.   Avoid alcohol as this may increase your tendency to bleed.   You may put an ice pack around the bandaged area for 20 minutes at a time as needed. This may help reduce swelling, bruising, and pain. Make sure the ice pack is waterproof so that the pressure bandage doesn t get wet.  If the wound is on the face try to sleep with your head elevated. Either in a recliner or propped up in bed, this will decrease swelling around the eyes.   You may see a small amount of drainage or blood on your pressure bandage. This is normal. However:  If drainage or bleeding continues or saturates the bandage, you will  need to apply firm pressure over the bandage with a piece of gauze for 15 minutes.  If bleeding continues after applying pressure for 15 minutes, apply an ice pack to the bandaged area for 15 minutes.  If bleeding still continues, call our office or go to the nearest emergency room.    Remove pressure dressing 48 hours after surgery:    Carefully remove the pressure bandage. If it seems sticky or too difficult to get off, you may need to soak it off in the shower.  After the pressure dressing is removed, you may shower and get the wound wet. However, Do Not let the forceful stream of the shower hit the wound directly.  Follow these wound care and dressing change instructions:    You have skin glue over the stitches. This will dry and flake off with time (about 2 weeks). If the stitches are still hanging around after 2 weeks, let us know, we can clip them out for you if they do not fall out with washing.   You may allow water to run over the site. Do not soak.  Do Not rub or scrub the site.  Pat dry after the shower or bath.  Avoid topical medications, lotions, creams, ointment,or oils.  Do not use tanning lamps or expose the site to sun.   Check wound appearance daily, some swelling and redness is normal after a procedure but should go away as your would is healing. If the swelling and redness or pain increases or if any other signs of infection occur listed below please send in a photo via my chart and or call us to let us know.  The clear glue film should start peeling and flaking off approximately around 2 weeks. By this time your wound should be sufficiently healed. If it still looks to be healing when the glue comes off you can clean the wound with soapy warm water daily in the shower. No need to bandage further, but you can put a bandage on the area daily for the two weeks if you would like.   If the glue comes off early before 2 weeks, apply vaseline and a bandage daily until the 2 weeks post-surgery date.  "Make sure to continue to clean the area daily before applying the vaseline and bandage.   If skin glue and sutures are still intact at 2 weeks after your procedure, you can start applying Vaseline daily to help soften up the skin glue. It will come off easier this way.        If you are able to take acetaminophen (\"Tylenol,\" etc.) and ibuprofen (\"Advil\" or \"Motrin,\" etc.), then you may STAGGER these medications by taking 400 mg of ibuprofen (usually two tabs) every 8 hours and 1,000 mg of acetaminophen (e.g., two tabs of extra-strength Tylenol) every 8 hours.    This means, for example, that you could take the followin,000 mg of Tylenol, followed 4 hours later by 400 mg Ibuprofen, followed 4 hours later with 1,000 mg of Tylenol, followed 4 hours later by 400 mg Ibuprofen, followed 4 hours later with 1,000 mg of Tylenol, and so forth.     Essentially, you can take either 1,000 mg of Tylenol or 400 mg of ibuprofen in alternating fashion EVERY FOUR HOURS.    Do NOT exceed more than 4,000 mg of Tylenol or 3,200 mg of ibuprofen per 24 hours. If you are not able to take Tylenol or ibuprofen as above due to other health issues (or a physician has told you directly that you are not allowed to take one of them, say due to pre-existing severe liver or kidney issues), then disregard the above directions.    Scientific evidence supports that this combination/schedule of pain medications is just as effective, if not more effective, than taking a narcotic pain medicine.       Follow up will be a 3 month scar evaluation either in person or via a telephone visit with you sending in a photo via [employer]. Unless you have been told to follow up sooner or if you have concerns and would like to be see sooner. Please call or send us in a [employer] message if possible and attach a photo.        What to expect:    The first couple of days your wound may be tender and may bleed slightly when doing wound care.  There may be swelling and " bruising around the wound, especially if it is near the eyes. For your comfort, you may apply ice or cold compresses to the bruises after your have removed the pressure bandage.  The area around your wound may be numb for several weeks or even months.  You may experience periodic sharp pain or mild itching around the wound as it heals.   The suture line will look dark for a while but will lighten over time.       When to call us:    You have bleeding that will not stop after applying pressure and ice.  You have pain that is not controlled with Tylenol (acetaminophen.)  You have signs or symptoms of an infection such as:  Fever over 100 degrees Fahrenheit  Redness, warmth or foul-smelling drainage from the wound  If you have any questions, or are not sure how to take care of the wound.    Phone numbers:    During business hours (M-F 8:00-4:30 p.m.)  [facility]-  [phone] Option 1 appt. Desk and ask for the Dermatology Surgery Team  [phone] [facility] .     ---------------------------------------------------------  Evenings/Weekends/Holidays  Hospital - [phone]   TTY for hearing pznwyopb-[phone]  *Ask  to page dermatologist on-call  Emergency Tyrs-[phone]  TTY for hearing impaired- [phone]

## 2025-04-29 NOTE — NURSING NOTE
Chief Complaint   Patient presents with    Procedure     Mohs and reconstruction to right temple.      Lisa PORTER CMA

## 2025-04-29 NOTE — LETTER
4/29/2025       RE: Cornelius Ruiz  [address]  [facility] [facility] [address]     Dear Colleague,    Thank you for referring your patient, Cornelius Ruiz, to the [facility] [place] [facility]. Please see a copy of my visit note below.    [facility] [place] Procedure Note    Dermatology Problem List:  # LTM - nasal tip - resolved 12/9/2024 s/p Efudex- Dovonex   1. Melanoma in situ  - MIS/LM, left upper paraspinal back, s/p WLE 1/20/2025  - MIS, left back, s/p bx 4/1/22, s/p excision  2. NMSC  - niacinamide 500 mg po BID  - snBCC, right nostril, s/p bx 01/20/25, s/p MMS 02/20/25  - SCC, right chest, s/p MMS 01/20/25  - BCC, right upper back, s/p ED&C 01/20/25  - SCC, right sideburn, s/p bx 4/1/22, s/p MMS 4/26/22  - SCC, right lateral cheek, s/p bx 4/1/22, s/p MMS 4/26/22  - BCC, right upper chest, s/p bx 4/1/22, s/p excision 05/2022  - BCC, left upper chest, s/p bx 4/1/22, s/p excision 05/2022  -BCC, Right temple s/p MMS 04/29/25  - 11/18/22 SCC R anterior shoulder  - 11/18/22 nBCC R mid cheek s/p mohs  - 11/18/22 SCC L lateral cheek s/p mohs  - 11/18/22 MM Mid paraspinal back 0.5mm BD s/p WLE  - 11/18/22 SCCIS L scapular back s/p mohs  - 11/18/22 SCCIS R medial clavicle  s/p mohs  3. 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 Cellulitis, chest  - Doxycycline 100 mg BID x10 days initiated 5/25/2022  5. Diffuse actinic damage   - Efudex/calcipotriene cream (initiaited to face 11/18/2022)  - previously completed efudex alone on the chest with moderate to good response  - future: consider PDT    Date of Service:  Apr 29, 2025  Surgery: Mohs micrographic surgery    Case 1  Repair Type: complex  Repair Size: 3.0 cm  Suture Material: vicryl rapide 5-0  Tumor Type: SCC - Squamous cell carcinoma  Location: [facility]  Derm-Path Accession #: [id number]  PreOp Size: 1.0 x 1.0 cm  PostOp Size: 1.6 x  1.4 cm  [facility] Accession #:   Level of Defect: fat      Procedure:  We discussed the principles of treatment and most likely complications including scarring, bleeding, infection, swelling, pain, crusting, nerve damage, large wound,  incomplete excision, wound dehiscence,  nerve damage, recurrence, and a second procedure may be recommended to obtain the best cosmetic or functional result.    Informed consent was obtained and the patient underwent the procedure as follows:  The patient was placed supine on the operating table.  The cancer was identified, outlined with a marker, and verified by the patient.  The entire surgical field was prepped with Hibiclens.  The surgical site was anesthetized using Lidocaine 1%.      The area of clinically apparent tumor was debulked. The layer of tissue was then surgically excised using a #15 blade and was then transferred onto a specimen sheet maintaining the orientation of the specimen. Hemostasis was obtained using heat cautery. The wound site was then covered with a dressing while the tissue samples were processed for examination.    The excised tissue was transported to the [facility] histology laboratory maintaining the tissue orientation.  The tissue specimen was relaxed so that the entire surgical margin was in a a single horizontal plane for sectioning and inked for precise mapping.  A precise reference map was drawn to reflect the sectioning of the specimen, colored inking of the margins, and orientation on the patient. The tissue was processed using horizontal sectioning of the base and continuous peripheral margins.  The histopathologic sections were reviewed in conjunction with the reference map.    Total blocks: 1    Total slides:  2    There were no cancer cells visualized on examination, therefore Mohs surgery was complete.    Reconstruction: Complex Closure    The patient was taken to the operative suite and placed supine on the operating room table. The defect was  identified. Appropriate markings were made with a marking pen to plan the repair. The area was infiltrated with Lidocaine 1% and prepped with Hibiclens and draped with sterile towels.     The wound was debeveled and undermined widely. Cones were excised within relaxed skin tension lines on both sides of the defect. Hemostasis was obtained using heat cautery. The wound was narrowed with SMAS placation and the dermis and subcutaneous (nonmuscle) fascia tissues were then approximated using monocryl 4-0  sutures (deep layer suturing). The wound edges were then approximated additional buried sutures were placed in a similar fashion where needed.  Percutaneous simple running vicryl rapide 5-0 sutures (superficial layer suturing) were carefully placed for maximum eversion and meticulous approximation.    Repair Size: 3.0 cm  Sutures Used:  Deep: monocryl 4-0 Superficial: vicryl rapide 5-0     The wound was cleansed with saline and ointment was applied along the wound surface.     A sterile pressure dressing was applied.  Wound care instructions were given verbally and in writing.  The patient left the operating suite in stable condition.  Patient was informed that additional refinement of the resulting surgical scar may be used as a second stage of this reconstruction.     Dr. Jean Carlos Peralta MD was physically present  for the entire procedure and always immediately available.     Scribe Disclosure:   I, Piero Reid, am serving as a scribe; to document services personally performed by Jean Carlos Peralta MD -based on data collection and the provider's statements to me.     Provider Disclosure:  I agree with above History, Review of Systems, Physical exam and Plan.  I have reviewed the content of the documentation and have edited it as needed. I have personally performed the services documented here and the documentation accurately represents those services and the decisions I have made.      Electronically signed  by:  Attending attestation:  I personally performed the entire procedure.  I have reviewed the note and edited it as necessary, and agree with its contents.    Jean Carlos Peralta M.D.  Professor  Director of Dermatologic Surgery  Department of Dermatology  [facility]    Dermatology Surgery Clinic  [facility] [place] [address]        Again, thank you for allowing me to participate in the care of your patient.      Sincerely,    Jean Carlos Peralta MD

## 2025-04-29 NOTE — PROGRESS NOTES
[facility] Dermatologic Surgery [facility] [place] Procedure Note    Dermatology Problem List:  # LTM - nasal tip - resolved 12/9/2024 s/p Efudex- Dovonex   1. Melanoma in situ  - MIS/LM, left upper paraspinal back, s/p WLE 1/20/2025  - MIS, left back, s/p bx 4/1/22, s/p excision  2. NMSC  - niacinamide 500 mg po BID  - snBCC, right nostril, s/p bx 01/20/25, s/p MMS 02/20/25  - SCC, right chest, s/p MMS 01/20/25  - BCC, right upper back, s/p ED&C 01/20/25  - SCC, right sideburn, s/p bx 4/1/22, s/p MMS 4/26/22  - SCC, right lateral cheek, s/p bx 4/1/22, s/p MMS 4/26/22  - BCC, right upper chest, s/p bx 4/1/22, s/p excision 05/2022  - BCC, left upper chest, s/p bx 4/1/22, s/p excision 05/2022  -BCC, Right temple s/p MMS 04/29/25  - 11/18/22 SCC R anterior shoulder  - 11/18/22 nBCC R mid cheek s/p mohs  - 11/18/22 SCC L lateral cheek s/p mohs  - 11/18/22 MM Mid paraspinal back 0.5mm BD s/p WLE  - 11/18/22 SCCIS L scapular back s/p mohs  - 11/18/22 SCCIS R medial clavicle  s/p mohs  3. 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 Cellulitis, chest  - Doxycycline 100 mg BID x10 days initiated 5/25/2022  5. Diffuse actinic damage   - Efudex/calcipotriene cream (initiaited to face 11/18/2022)  - previously completed efudex alone on the chest with moderate to good response  - future: consider PDT    Date of Service:  Apr 29, 2025  Surgery: Mohs micrographic surgery    Case 1  Repair Type: complex  Repair Size: 3.0 cm  Suture Material: vicryl rapide 5-0  Tumor Type: SCC - Squamous cell carcinoma  Location: [facility]  Derm-Path Accession #: [id number]  PreOp Size: 1.0 x 1.0 cm  PostOp Size: 1.6 x 1.4 cm  Mohs Accession #:   Level of Defect: fat      Procedure:  We discussed the principles of treatment and most likely complications including scarring, bleeding, infection, swelling, pain, crusting, nerve damage, large wound,  incomplete excision, wound dehiscence,  nerve damage, recurrence, and a second procedure may  be recommended to obtain the best cosmetic or functional result.    Informed consent was obtained and the patient underwent the procedure as follows:  The patient was placed supine on the operating table.  The cancer was identified, outlined with a marker, and verified by the patient.  The entire surgical field was prepped with Hibiclens.  The surgical site was anesthetized using Lidocaine 1%.      The area of clinically apparent tumor was debulked. The layer of tissue was then surgically excised using a #15 blade and was then transferred onto a specimen sheet maintaining the orientation of the specimen. Hemostasis was obtained using heat cautery. The wound site was then covered with a dressing while the tissue samples were processed for examination.    The excised tissue was transported to the Mohs histology laboratory maintaining the tissue orientation.  The tissue specimen was relaxed so that the entire surgical margin was in a a single horizontal plane for sectioning and inked for precise mapping.  A precise reference map was drawn to reflect the sectioning of the specimen, colored inking of the margins, and orientation on the patient. The tissue was processed using horizontal sectioning of the base and continuous peripheral margins.  The histopathologic sections were reviewed in conjunction with the reference map.    Total blocks: 1    Total slides:  2    There were no cancer cells visualized on examination, therefore Mohs surgery was complete.    Reconstruction: Complex Closure    The patient was taken to the operative suite and placed supine on the operating room table. The defect was identified. Appropriate markings were made with a marking pen to plan the repair. The area was infiltrated with Lidocaine 1% and prepped with Hibiclens and draped with sterile towels.     The wound was debeveled and undermined widely. Cones were excised within relaxed skin tension lines on both sides of the defect. Hemostasis  was obtained using heat cautery. The wound was narrowed with SMAS placation and the dermis and subcutaneous (nonmuscle) fascia tissues were then approximated using monocryl 4-0  sutures (deep layer suturing). The wound edges were then approximated additional buried sutures were placed in a similar fashion where needed.  Percutaneous simple running vicryl rapide 5-0 sutures (superficial layer suturing) were carefully placed for maximum eversion and meticulous approximation.    Repair Size: 3.0 cm  Sutures Used:  Deep: monocryl 4-0 Superficial: vicryl rapide 5-0     The wound was cleansed with saline and ointment was applied along the wound surface.     A sterile pressure dressing was applied.  Wound care instructions were given verbally and in writing.  The patient left the operating suite in stable condition.  Patient was informed that additional refinement of the resulting surgical scar may be used as a second stage of this reconstruction.     Dr. Jean Carlos Peralta MD was physically present  for the entire procedure and always immediately available.     Scribe Disclosure:   I, Piero Reid, am serving as a scribe; to document services personally performed by Jean Carlos Peralta MD -based on data collection and the provider's statements to me.     Provider Disclosure:  I agree with above History, Review of Systems, Physical exam and Plan.  I have reviewed the content of the documentation and have edited it as needed. I have personally performed the services documented here and the documentation accurately represents those services and the decisions I have made.      Electronically signed by:  Attending attestation:  I personally performed the entire procedure.  I have reviewed the note and edited it as necessary, and agree with its contents.    Jean Carlos Peralta M.D.  Professor  Director of Dermatologic Surgery  Department of Dermatology  [facility]    Dermatology Surgery Clinic  [facility]  [facility] [address] [address]

## 2025-05-09 PROBLEM — E11.9 DIABETES MELLITUS, TYPE 2 (H): Chronic | Status: ACTIVE | Noted: 2019-08-25

## 2025-05-09 PROBLEM — R11.0 NAUSEA: Status: ACTIVE | Noted: 2023-12-30

## 2025-05-09 PROBLEM — U07.1 COVID-19: Status: ACTIVE | Noted: 2021-10-13

## 2025-05-09 PROBLEM — K83.1 CHOLESTASIS (H): Status: ACTIVE | Noted: 2023-12-30

## 2025-05-09 PROBLEM — C44.320 SQUAMOUS CELL CARCINOMA OF SKIN OF FACE: Status: ACTIVE | Noted: 2025-05-09

## 2025-05-09 PROBLEM — C44.310 BCC (BASAL CELL CARCINOMA), FACE: Status: ACTIVE | Noted: 2025-05-09

## 2025-05-09 PROBLEM — Z90.49 HISTORY OF CHOLECYSTECTOMY: Status: ACTIVE | Noted: 2023-12-30

## 2025-05-09 PROBLEM — K85.00 IDIOPATHIC ACUTE PANCREATITIS WITHOUT INFECTION OR NECROSIS: Status: ACTIVE | Noted: 2023-12-29

## 2025-05-09 PROBLEM — C43.9 MELANOMA OF SKIN (H): Status: ACTIVE | Noted: 2025-05-09

## 2025-05-09 PROBLEM — E55.9 VITAMIN D DEFICIENCY: Status: ACTIVE | Noted: 2025-05-09

## 2025-05-12 ENCOUNTER — PATIENT OUTREACH (OUTPATIENT)
Dept: CARE COORDINATION | Facility: CLINIC | Age: 65
End: 2025-05-12
Payer: COMMERCIAL

## 2025-05-14 ENCOUNTER — PATIENT OUTREACH (OUTPATIENT)
Dept: CARE COORDINATION | Facility: CLINIC | Age: 65
End: 2025-05-14
Payer: COMMERCIAL

## 2025-05-16 ENCOUNTER — TELEPHONE (OUTPATIENT)
Dept: PEDIATRICS | Facility: CLINIC | Age: 65
End: 2025-05-16
Payer: COMMERCIAL

## 2025-05-19 ENCOUNTER — OFFICE VISIT (OUTPATIENT)
Dept: PEDIATRICS | Facility: CLINIC | Age: 65
End: 2025-05-19
Payer: COMMERCIAL

## 2025-05-19 VITALS
BODY MASS INDEX: 34.83 KG/M2 | OXYGEN SATURATION: 97 % | TEMPERATURE: 97.5 F | HEART RATE: 74 BPM | WEIGHT: 243.3 LBS | HEIGHT: 70 IN | SYSTOLIC BLOOD PRESSURE: 134 MMHG | RESPIRATION RATE: 16 BRPM | DIASTOLIC BLOOD PRESSURE: 86 MMHG

## 2025-05-19 DIAGNOSIS — K85.00 IDIOPATHIC ACUTE PANCREATITIS WITHOUT INFECTION OR NECROSIS: ICD-10-CM

## 2025-05-19 DIAGNOSIS — R79.89 LOW TESTOSTERONE: ICD-10-CM

## 2025-05-19 DIAGNOSIS — E11.65 TYPE 2 DIABETES MELLITUS WITH HYPERGLYCEMIA, WITH LONG-TERM CURRENT USE OF INSULIN (H): Primary | ICD-10-CM

## 2025-05-19 DIAGNOSIS — Z79.4 TYPE 2 DIABETES MELLITUS WITH HYPERGLYCEMIA, WITH LONG-TERM CURRENT USE OF INSULIN (H): Primary | ICD-10-CM

## 2025-05-19 PROBLEM — U07.1 COVID-19: Status: RESOLVED | Noted: 2021-10-13 | Resolved: 2025-05-19

## 2025-05-19 PROBLEM — K83.1 CHOLESTASIS (H): Status: RESOLVED | Noted: 2023-12-30 | Resolved: 2025-05-19

## 2025-05-19 PROCEDURE — 99214 OFFICE O/P EST MOD 30 MIN: CPT | Mod: 24 | Performed by: INTERNAL MEDICINE

## 2025-05-19 PROCEDURE — G2211 COMPLEX E/M VISIT ADD ON: HCPCS | Performed by: INTERNAL MEDICINE

## 2025-05-19 RX ORDER — ONDANSETRON 4 MG/1
4 TABLET, ORALLY DISINTEGRATING ORAL EVERY 6 HOURS PRN
COMMUNITY

## 2025-05-19 RX ORDER — COLESTIPOL HYDROCHLORIDE 1 G/1
1 TABLET ORAL 2 TIMES DAILY
COMMUNITY

## 2025-05-19 ASSESSMENT — PAIN SCALES - GENERAL: PAINLEVEL_OUTOF10: MODERATE PAIN (4)

## 2025-05-19 NOTE — PROGRESS NOTES
Assessment & Plan       ICD-10-CM    1. Type 2 diabetes mellitus with hyperglycemia, with long-term current use of insulin (H)  E11.65 sitagliptin (JANUVIA) 100 MG tablet    Z79.4 Lipid panel reflex to direct LDL Fasting     Hemoglobin A1c      2. Idiopathic acute pancreatitis without infection or necrosis  K85.00       3. Low testosterone  R79.89 Testosterone, total        Here to establish care with me as well as review of chronic health issues.    Type 2 diabetes.  A1c indicates this is not under good control.  He has been gaining weight so I am hesitant to increase his Lantus dosing.  We reviewed other options.  Will try Januvia 100 mg daily.  If this is not covered or not tolerated he should contact me.    Ongoing issues with chronic pancreatitis.  He is being followed by GI.  I did not make any changes with these medications today.  He has a CT on order.    Low testosterone level.  This is checked in the afternoon and recommend repeating the morning check before additional diagnostic testing is undertaken.      Recommend follow-up with me in 2-3 months with labwork prior to the visit.       Gonsalo Lyn MD     Shawnee Shields is a 64 year old, presenting for the following health issues:  Follow Up        5/19/2025     2:36 PM   Additional Questions   Roomed by AY         5/19/2025     2:36 PM   Patient Reported Additional Medications   Patient reports taking the following new medications none     History of Present Illness       Reason for visit:  Restart with new docters blood work  a plan   He is taking medications regularly.      Today is my first visit with Johnnie.  He is here to follow-up with his chronic medical issues.  He was evaluated in clinic a few weeks ago.  We reviewed lab results from that visit.    Pancreas pain that would like to get resolved  Unable to sleep at night   Being managed by [facility].  Just started Zofran prn and Colestipol last week  Has CT on order but is not yet  "scheduled.    He is not having a significant increase in his pain.    He underwent cholecystectomy in the past.  He notes significant changes with GI function with certain foods especially fatty foods.  He is hopeful that the colestipol will help regulate his stools.    Type 2 diabetes  Using Lantus current dosing of 25 units daily.  We reviewed his recent A1c result which is high.  Lab Results   Component Value Date    A1C 9.6 05/09/2025     He had taken metformin in the past without cause significant GI distress.  We reviewed other options especially in light of his chronic pancreatitis symptoms.    He also has a history of recurrent basal cell carcinoma as well as history of melanoma.  He has had several surgeries done for removal.  He is followed closely by dermatology.  He does not have any new skin lesions at this point in time.    With his recent lab work testosterone was checked.  It was below the normal range but was also checked at 2:00 in the afternoon.  He is feeling generalized fatigue.  We reviewed other causes for fatigue.        Objective    /86   Pulse 74   Temp 97.5  F (36.4  C) (Temporal)   Resp 16   Ht 1.778 m (5' 10\")   Wt 110.4 kg (243 lb 4.8 oz)   SpO2 97%   BMI 34.91 kg/m    Body mass index is 34.91 kg/m .  Physical Exam   GENERAL: healthy, alert and no distress  EYES: PERRL, EOMI  HENT: ear canals and TM's normal. No nasal discharge. OP moist.  NECK: no adenopathy  RESP: lungs clear to auscultation - no rales, rhonchi or wheezes  CV: regular rate and rhythm, normal S1 S2, no murmur, no peripheral edema  ABDOMEN: soft, nontender, bowel sounds normal  MS: no gross musculoskeletal defects noted  PSYCH: mentation appears normal, affect normal              Signed Electronically by: Gonsalo Lyn MD    "

## 2025-05-20 ENCOUNTER — ALLIED HEALTH/NURSE VISIT (OUTPATIENT)
Dept: EDUCATION SERVICES | Facility: CLINIC | Age: 65
End: 2025-05-20
Attending: PHYSICIAN ASSISTANT
Payer: COMMERCIAL

## 2025-05-20 DIAGNOSIS — Z79.4 TYPE 2 DIABETES MELLITUS WITHOUT COMPLICATION, WITH LONG-TERM CURRENT USE OF INSULIN (H): ICD-10-CM

## 2025-05-20 DIAGNOSIS — E11.9 TYPE 2 DIABETES MELLITUS WITHOUT COMPLICATION, WITH LONG-TERM CURRENT USE OF INSULIN (H): ICD-10-CM

## 2025-05-20 PROCEDURE — G0108 DIAB MANAGE TRN  PER INDIV: HCPCS | Mod: AE

## 2025-05-20 NOTE — PATIENT INSTRUCTIONS
Care Plan:  Check blood sugars: we are checking on coverage for the Dexcom and Deepthi sensor.    Medication: HOLD the Januvia until your CT -GI/ pancreas evaluation is complete. We can reevaluate after your test. Call us with an update after the test is complete.    Follow up:  Follow up on June 12th at 2:30pm.     Bring blood glucose meter and logbook with you to all doctor and follow-up appointments.     [place] [facility] for the [facility]:  For Your Diabetes or Nutrition Education Appointments Call:  [phone]   For Diabetes or Nutrition Related Questions:   [phone]  Send Trending T[employer] Message   If you need a medication refill please contact your pharmacy. Please allow 3 business days for your refills to be completed.

## 2025-05-20 NOTE — PROGRESS NOTES
Diabetes Self-Management Education & Support    Presents for: Individual review    Type of Service: In Person Visit      Assessment  Patient here for diabetes education, he wants to control it with lifestyle changes, but is taking his insulin every day, and started Januvia yesterday. He does not check his blood sugar but is interested in a CGM.  He states he has a history of pancreatitis, and always has some abdominal pain but it has been worse the last few days. He states his GI doctor has order a stat CT for him, he is just waiting for a call to schedule the appointment.     Discussed with patient the need to have his GI assessment done first. We discussed benefit of monitoring glucose with meter or CGM to evaluate glcuose levels and patterns to best treat his diabetes. Discussed medication action, and recommend he hold the januvia at this time.      Patient's most recent   Lab Results   Component Value Date    A1C 9.6 05/09/2025     is not meeting goal of <7.0    Diabetes knowledge and skills assessment:   Patient is knowledgeable in diabetes management concepts related to: na    Based on learning assessment above, most appropriate setting for further diabetes education would be: Individual setting.    Care Plan and Education Provided:  Monitoring: Purpose and CGM  Taking Medication: Action of prescribed medication(s) and Side effects of prescribed medication(s)  Problem Solving: Low glucose - causes, signs/symptoms, treatment and prevention    Patient verbalized understanding of diabetes self-management education concepts discussed, opportunities for ongoing education and support, and recommendations provided today.    Plan  Check blood sugars: we are checking on coverage for the Dexcom and Deepthi sensor.    Medication: HOLD the Januvia until your CT -GI/ pancreas evaluation is complete. We can reevaluate after your test. Call us with an update after the test is complete.    Follow up:  Follow up on June 12th at  "2:30pm.    Topics to cover at upcoming visits: Healthy Eating, Being Active, Monitoring, Taking Medication, Problem Solving, Reducing Risks, and Healthy Coping    See Care Plan for co-developed, patient-state behavior change goals.    Education Materials Provided:  No new materials provided today      Subjective/Objective  Cornelius is an 64 year old, presenting for the following diabetes education related to: Individual review  Accompanied by: Self  Diabetes education in the past 24mo: (Patient-Rptd) No  Focus of Visit: Patient Unsure  Diabetes type: (Patient-Rptd) Type 2  How confident are you filling out medical forms by yourself:: (Patient-Rptd) Extremely  Diabetes management related comments/concerns: (Patient-Rptd) a1c high pancretis  Transportation concerns: No  Difficulty affording diabetes medication?: No  Difficulty affording diabetes testing supplies?: No  Other concerns: None  Cultural Influences/Ethnic Background:  Not  or     Diabetes Symptoms & Complications:  Diabetes Related Symptoms: (Patient-Rptd) Fatigue, Polydipsia (increased thirst), Polyuria (increased urination)  Complications assessed today?: No    Patient Problem List and Family Medical History reviewed for relevant medical history, current medical status, and diabetes risk factors.    Vitals:  There were no vitals taken for this visit.  Estimated body mass index is 34.91 kg/m  as calculated from the following:    Height as of 5/19/25: 1.778 m (5' 10\").    Weight as of 5/19/25: 110.4 kg (243 lb 4.8 oz).   Last 3 BP:   BP Readings from Last 3 Encounters:   05/19/25 134/86   05/09/25 125/89   04/29/25 (!) 137/92       History   Smoking Status    Never   Smokeless Tobacco    Never       Labs:  Lab Results   Component Value Date    A1C 9.6 05/09/2025     Lab Results   Component Value Date     05/09/2025     No results found for: \"LDL\"  No results found for: \"HDL\"  GFR Estimate   Date Value Ref Range Status   05/09/2025 >90 >60 " "mL/min/1.73m2 Final     Comment:     eGFR calculated using 2021 CKD-EPI equation.     No results found for: \"GFRESTBLACK\"  Lab Results   Component Value Date    CR 0.65 05/09/2025     Lab Results   Component Value Date    MICROL 385.0 05/09/2025    UMALCR 139.49 (H) 05/09/2025    UCRR 276.0 05/09/2025 5/20/2025   Monitoring   Monitoring Assessed Today Yes   Did patient bring glucose meter to appointment?  No   Times checking blood sugar at home (number) Never       Diabetes Medication(s)       Dipeptidyl Peptidase-4 (DPP-4) Inhibitors       sitagliptin (JANUVIA) 100 MG tablet Take 1 tablet (100 mg) by mouth daily.       Insulin       insulin glargine (LANTUS VIAL) 100 UNIT/ML vial Inject 25 Units subcutaneously at bedtime.              5/20/2025   Taking Medications   Taking Medication Assessed Today Yes   Current Treatments Insulin Injections;Oral Medication (taken by mouth)   Problems taking diabetes medications regularly? No   Diabetes medication side effects? Yes         5/20/2025   Problem Solving   Problem Solving Assessed Today Yes   Is the patient at risk for hypoglycemia? Yes   Hypoglycemia Frequency Never       Shawna VEGAN, RN, PHN, [facility]   Time Spent: 30 minutes  Encounter Type: Individual    Any diabetes medication dose changes were made via the [facility] Standing Orders under the patient's referring provider.    "

## 2025-05-20 NOTE — LETTER
5/20/2025         RE: Cornelius Ruiz  [address]  [place] [address]        Dear Colleague,    Thank you for referring your patient, Cornelius Ruiz, to the [facility]. Please see a copy of my visit note below.    Diabetes Self-Management Education & Support    Presents for: Individual review    Type of Service: In Person Visit      Assessment  Patient here for diabetes education, he wants to control it with lifestyle changes, but is taking his insulin every day, and started Januvia yesterday. He does not check his blood sugar but is interested in a CGM.  He states he has a history of pancreatitis, and always has some abdominal pain but it has been worse the last few days. He states his GI doctor has order a stat CT for him, he is just waiting for a call to schedule the appointment.     Discussed with patient the need to have his GI assessment done first. We discussed benefit of monitoring glucose with meter or CGM to evaluate glcuose levels and patterns to best treat his diabetes. Discussed medication action, and recommend he hold the januvia at this time.      Patient's most recent   Lab Results   Component Value Date    A1C 9.6 05/09/2025     is not meeting goal of <7.0    Diabetes knowledge and skills assessment:   Patient is knowledgeable in diabetes management concepts related to: na    Based on learning assessment above, most appropriate setting for further diabetes education would be: Individual setting.    Care Plan and Education Provided:  Monitoring: Purpose and CGM  Taking Medication: Action of prescribed medication(s) and Side effects of prescribed medication(s)  Problem Solving: Low glucose - causes, signs/symptoms, treatment and prevention    Patient verbalized understanding of diabetes self-management education concepts discussed, opportunities for ongoing education and support, and recommendations provided today.    Plan  Check blood sugars: we are  "checking on coverage for the Dexcom and Deepthi sensor.    Medication: HOLD the Januvia until your CT -GI/ pancreas evaluation is complete. We can reevaluate after your test. Call us with an update after the test is complete.    Follow up:  Follow up on June 12th at 2:30pm.    Topics to cover at upcoming visits: Healthy Eating, Being Active, Monitoring, Taking Medication, Problem Solving, Reducing Risks, and Healthy Coping    See Care Plan for co-developed, patient-state behavior change goals.    Education Materials Provided:  No new materials provided today      Subjective/Objective  Cornelius is an 64 year old, presenting for the following diabetes education related to: Individual review  Accompanied by: Self  Diabetes education in the past 24mo: (Patient-Rptd) No  Focus of Visit: Patient Unsure  Diabetes type: (Patient-Rptd) Type 2  How confident are you filling out medical forms by yourself:: (Patient-Rptd) Extremely  Diabetes management related comments/concerns: (Patient-Rptd) a1c high pancretis  Transportation concerns: No  Difficulty affording diabetes medication?: No  Difficulty affording diabetes testing supplies?: No  Other concerns: None  Cultural Influences/Ethnic Background:  Not  or     Diabetes Symptoms & Complications:  Diabetes Related Symptoms: (Patient-Rptd) Fatigue, Polydipsia (increased thirst), Polyuria (increased urination)  Complications assessed today?: No    Patient Problem List and Family Medical History reviewed for relevant medical history, current medical status, and diabetes risk factors.    Vitals:  There were no vitals taken for this visit.  Estimated body mass index is 34.91 kg/m  as calculated from the following:    Height as of 5/19/25: 1.778 m (5' 10\").    Weight as of 5/19/25: 110.4 kg (243 lb 4.8 oz).   Last 3 BP:   BP Readings from Last 3 Encounters:   05/19/25 134/86   05/09/25 125/89   04/29/25 (!) 137/92       History   Smoking Status     Never   Smokeless Tobacco " "    Never       Labs:  Lab Results   Component Value Date    A1C 9.6 05/09/2025     Lab Results   Component Value Date     05/09/2025     No results found for: \"LDL\"  No results found for: \"HDL\"  GFR Estimate   Date Value Ref Range Status   05/09/2025 >90 >60 mL/min/1.73m2 Final     Comment:     eGFR calculated using 2021 CKD-EPI equation.     No results found for: \"GFRESTBLACK\"  Lab Results   Component Value Date    CR 0.65 05/09/2025     Lab Results   Component Value Date    MICROL 385.0 05/09/2025    UMALCR 139.49 (H) 05/09/2025    UCRR 276.0 05/09/2025 5/20/2025   Monitoring   Monitoring Assessed Today Yes   Did patient bring glucose meter to appointment?  No   Times checking blood sugar at home (number) Never       Diabetes Medication(s)       Dipeptidyl Peptidase-4 (DPP-4) Inhibitors       sitagliptin (JANUVIA) 100 MG tablet Take 1 tablet (100 mg) by mouth daily.       Insulin       insulin glargine (LANTUS VIAL) 100 UNIT/ML vial Inject 25 Units subcutaneously at bedtime.              5/20/2025   Taking Medications   Taking Medication Assessed Today Yes   Current Treatments Insulin Injections;Oral Medication (taken by mouth)   Problems taking diabetes medications regularly? No   Diabetes medication side effects? Yes         5/20/2025   Problem Solving   Problem Solving Assessed Today Yes   Is the patient at risk for hypoglycemia? Yes   Hypoglycemia Frequency Never       Shawna VEGAN, RN, PHN, [facility]   Time Spent: 30 minutes  Encounter Type: Individual    Any diabetes medication dose changes were made via the [facility] Standing Orders under the patient's referring provider.      "

## 2025-05-21 ENCOUNTER — TELEPHONE (OUTPATIENT)
Dept: EDUCATION SERVICES | Facility: CLINIC | Age: 65
End: 2025-05-21
Payer: COMMERCIAL

## 2025-05-21 DIAGNOSIS — Z79.4 TYPE 2 DIABETES MELLITUS WITHOUT COMPLICATION, WITH LONG-TERM CURRENT USE OF INSULIN (H): Primary | ICD-10-CM

## 2025-05-21 DIAGNOSIS — E11.9 TYPE 2 DIABETES MELLITUS WITHOUT COMPLICATION, WITH LONG-TERM CURRENT USE OF INSULIN (H): Primary | ICD-10-CM

## 2025-05-21 RX ORDER — ACYCLOVIR 400 MG/1
1 TABLET ORAL
Qty: 9 EACH | Refills: 5 | OUTPATIENT
Start: 2025-05-21

## 2025-05-21 NOTE — TELEPHONE ENCOUNTER
Called to patient, No answer. Left voice mail per verbal ok from patient during his appointment  that  agree's the Januvia should be held. Also pharmacy liaison was not able check coverage of CGM. I will send a prescription for the Dexcom 7 sensor to have the pharmacy run the prescription, we can do a PA if needed.    Shawna VEGAN, RN, PHN, [facility]

## 2025-05-25 ENCOUNTER — HOSPITAL ENCOUNTER (OUTPATIENT)
Dept: CT IMAGING | Facility: CLINIC | Age: 65
Discharge: HOME | End: 2025-05-25
Attending: INTERNAL MEDICINE | Admitting: INTERNAL MEDICINE
Payer: COMMERCIAL

## 2025-05-25 DIAGNOSIS — R10.9 ABDOMINAL PAIN, ACUTE: ICD-10-CM

## 2025-05-25 PROCEDURE — 74177 CT ABD & PELVIS W/CONTRAST: CPT

## 2025-05-25 PROCEDURE — 250N000011 HC RX IP 250 OP 636: Performed by: INTERNAL MEDICINE

## 2025-05-25 RX ORDER — IOPAMIDOL 755 MG/ML
100 INJECTION, SOLUTION INTRAVASCULAR ONCE
Status: COMPLETED | OUTPATIENT
Start: 2025-05-25 | End: 2025-05-25

## 2025-05-25 RX ADMIN — IOPAMIDOL 100 ML: 755 INJECTION, SOLUTION INTRAVENOUS at 08:48

## 2025-07-01 ENCOUNTER — ALLIED HEALTH/NURSE VISIT (OUTPATIENT)
Dept: EDUCATION SERVICES | Facility: CLINIC | Age: 65
End: 2025-07-01
Payer: COMMERCIAL

## 2025-07-01 ENCOUNTER — OFFICE VISIT (OUTPATIENT)
Dept: PEDIATRICS | Facility: CLINIC | Age: 65
End: 2025-07-01
Payer: COMMERCIAL

## 2025-07-01 VITALS
TEMPERATURE: 97.8 F | BODY MASS INDEX: 34.49 KG/M2 | HEIGHT: 70 IN | HEART RATE: 93 BPM | RESPIRATION RATE: 18 BRPM | OXYGEN SATURATION: 98 % | WEIGHT: 240.9 LBS | SYSTOLIC BLOOD PRESSURE: 142 MMHG | DIASTOLIC BLOOD PRESSURE: 91 MMHG

## 2025-07-01 DIAGNOSIS — Z79.4 TYPE 2 DIABETES MELLITUS WITHOUT COMPLICATION, WITH LONG-TERM CURRENT USE OF INSULIN (H): Primary | ICD-10-CM

## 2025-07-01 DIAGNOSIS — E11.9 TYPE 2 DIABETES MELLITUS WITHOUT COMPLICATION, WITH LONG-TERM CURRENT USE OF INSULIN (H): Primary | ICD-10-CM

## 2025-07-01 DIAGNOSIS — H61.23 CERUMINOSIS, BILATERAL: Primary | ICD-10-CM

## 2025-07-01 PROCEDURE — G0108 DIAB MANAGE TRN  PER INDIV: HCPCS | Mod: AE

## 2025-07-01 PROCEDURE — 99213 OFFICE O/P EST LOW 20 MIN: CPT | Performed by: INTERNAL MEDICINE

## 2025-07-01 NOTE — PROGRESS NOTES
Diabetes Self-Management Education & Support    Presents for: Individual review    Type of Service: In Person Visit      Assessment  Patient states he saw his GI doctor, he does not have pancreatitis and was given the ok to continue on the Januvia.   He did receive the Dexcom G7 but has not started it and did not bring it today.    Discussed pathophysiology of type 2 DM.  Reviewed carbohydrate sources, portion control and the benefit of spreading intake throughout the day.  Encouraged 3 meals a day and small snacks between meals if hungry, adding fruits and vegetables to meals and snacks.  Illustrated the plate method and used food models to help demonstrate portion control.  Reviewed benefits of exercise to help with better blood glucose utilization.  Discussed the benefit of glucose monitoring, how the Dexcom sensor works and instructions on how to start the sensor.   Pt seems receptive and verbalized understanding of concepts discussed and recommendations provided.       Patient's most recent   Lab Results   Component Value Date    A1C 9.6 05/09/2025     is not meeting goal of <7.0        Care Plan and Education Provided:  Diabetes Pathophysiology  Healthy Eating: Balanced meals, Consistency in amount and timing of carbohydrate intake, and Plate planning method  Being Active: Amount recommended (150 minutes moderate or 75 minutes vigorous activity and 2-3 days strength training per week) and Relationship of activity to glucose  Monitoring: Blood glucose versus Continuous Glucose Monitoring, Frequency of monitoring, Individual glucose targets, and CGM instruction: Patient was instructed on Dexcom CGM system: basics of  the Dexcom sensor: insertion technique, sensor site location and rotation, insulin administration in relation to sensor placement  Taking Medication: Action of prescribed medication(s), Side effects of prescribed medication(s), and When to take medication(s)  Problem Solving: When to call a health  care provider  Reducing Risks: Goal for A1c, how it relates to glucose and how often to check  Healthy Coping: Benefits of making appropriate lifestyle changes    Patient verbalized understanding of diabetes self-management education concepts discussed, opportunities for ongoing education and support, and recommendations provided today.    Plan  Meal Plan Recommendation: eat 3 meals a day, have small snacks between meals, if needed, use portion control, use plate planning method, and refer to the meal plans and snack ideas.   Exercise / activity plan: continue with your activity, aim for at least 30 minutes 5 times a week.   Medication: continue current medications  Check blood sugars: set up your Dexcom sensor    Topics to cover at upcoming visits: Monitoring, Taking Medication, Problem Solving, Reducing Risks, and Healthy Coping    See Care Plan for co-developed, patient-state behavior change goals.    Education Materials Provided:  - [employer] Healthy Living with Diabetes Book  - My Plate Planner   - meal plans and snack ideas      Subjective/Objective  Cornelius is an 64 year old, presenting for the following diabetes education related to: Individual review  Accompanied by: Self  Diabetes education in the past 24mo: No  Focus of Visit: Patient Unsure  Diabetes type: Type 2  How confident are you filling out medical forms by yourself:: Extremely  Diabetes management related comments/concerns: a1c high pancretis  Transportation concerns: No  Difficulty affording diabetes medication?: No  Difficulty affording diabetes testing supplies?: No  Other concerns: None  Cultural Influences/Ethnic Background:  Not  or     Diabetes Symptoms & Complications:  Diabetes Related Symptoms: Fatigue, Polydipsia (increased thirst), Polyuria (increased urination)  Complications assessed today?: No    Patient Problem List and Family Medical History reviewed for relevant medical history, current medical status, and  "diabetes risk factors.    Vitals:  There were no vitals taken for this visit.  Estimated body mass index is 34.91 kg/m  as calculated from the following:    Height as of 5/19/25: 1.778 m (5' 10\").    Weight as of 5/19/25: 110.4 kg (243 lb 4.8 oz).   Last 3 BP:   BP Readings from Last 3 Encounters:   05/19/25 134/86   05/09/25 125/89   04/29/25 (!) 137/92       History   Smoking Status    Never   Smokeless Tobacco    Never       Labs:  Lab Results   Component Value Date    A1C 9.6 05/09/2025     Lab Results   Component Value Date     05/09/2025     No results found for: \"LDL\"  No results found for: \"HDL\"  GFR Estimate   Date Value Ref Range Status   05/09/2025 >90 >60 mL/min/1.73m2 Final     Comment:     eGFR calculated using 2021 CKD-EPI equation.     No results found for: \"GFRESTBLACK\"  Lab Results   Component Value Date    CR 0.65 05/09/2025     Lab Results   Component Value Date    MICROL 385.0 05/09/2025    UMALCR 139.49 (H) 05/09/2025    UCRR 276.0 05/09/2025 7/1/2025   Healthy Eating   Healthy Eating Assessed Today Yes   Cultural/Latter day diet restrictions? No   How many times a week on average do you eat food made away from home (restaurant/take-out)? 1   Meals include Breakfast;Lunch;Dinner;Evening Snack   Breakfast 3 eggs, water, coffee with stevia( once a week)   Lunch chicken, salad with Liquid Coconut Aminos   Dinner same as lunch Or scrambled eggs with parmesan cheese   Snacks chips, veggies with dip   Beverages Water;Tea;Coffee;Diet soda;Energy drinks   Has patient met with a dietitian in the past? No         7/1/2025   Being Active   Being Active Assessed Today No   Exercise: Yes   Days per week of moderate to strenuous exercise (like a brisk walk) 3       walk or [employer]   Barrier to exercise None         7/1/2025   Monitoring   Monitoring Assessed Today Yes   Did patient bring glucose meter to appointment?  No   Times checking blood sugar at home (number) Never     Diabetes " Medication(s)       Dipeptidyl Peptidase-4 (DPP-4) Inhibitors       sitagliptin (JANUVIA) 100 MG tablet Take 1 tablet (100 mg) by mouth daily.       Insulin       insulin glargine (LANTUS VIAL) 100 UNIT/ML vial Inject 25 Units subcutaneously at bedtime.              7/1/2025   Taking Medications   Taking Medication Assessed Today Yes   Current Treatments Insulin Injections;Oral Medication (taken by mouth)   Dose schedule At bedtime   Given by Patient   Injection/Infusion sites Abdomen   Problems taking diabetes medications regularly? No   Diabetes medication side effects? Yes         7/1/2025   Problem Solving   Problem Solving Assessed Today Yes   Is the patient at risk for hypoglycemia? Yes   Hypoglycemia Frequency Never           7/1/2025   Reducing Risks   Reducing Risks Assessed Today Yes   Diabetes Risks Age over 45 years;Sedentary Lifestyle;Family History   CAD Risks Diabetes Mellitus;Sedentary lifestyle;Obesity;Male sex;Family history   Has dilated eye exam at least once a year? No   Sees dentist every 6 months? Yes   Feet checked by healthcare provider in the last year? No         7/1/2025   Healthy Coping: Diabetes Distress Assessment   Healthy Coping Assessed Today No   I feel burned out by all of the attention and effort that diabetes demands of me. 3 - A Moderate Problem   It bothers me that diabetes seems to control my life. 3 - A Moderate Problem   I am frustrated that even when I do what I am supposed to for my diabetes, it doesn't seem to make a difference. 3 - A Moderate Problem   No matter how hard I try with my diabetes, it feels like it will never be good enough. 2 - A Little Problem   I am so tired of having to worry about diabetes all the time. 2 - A Little Problem   When it comes to my diabetes, I often feel like a failure. 2 - A Little Problem   It depresses me when I realize that my diabetes will likely never go away. 2 - A Little Problem   Living with diabetes is overwhelming for me. 2 - A  Little Problem   T2 DDAS Total Score (0 - 1.9 Little or no DD, 2.0 - 2.9 Moderate DD,  3.0+ High DD) 2.4   Informal Support system: Children;[place] based;Family;Parent       Shawna GOMES, RN, PHN, [facility]   Time Spent: 60 minutes  Encounter Type: Individual    Any diabetes medication dose changes were made via the [facility] Standing Orders under the patient's referring provider.

## 2025-07-01 NOTE — PATIENT INSTRUCTIONS
"  Care Plan:  Meal Plan Recommendation: eat 3 meals a day, have small snacks between meals, if needed, use portion control, use plate planning method, and refer to the meal plans and snack ideas.   Exercise / activity plan: continue with your activity, aim for at least 30 minutes 5 times a week.   Medication: continue current medications  Check blood sugars: set up your Dexcom sensor      Dexcom G7    SENSOR BASICS:  Understand that your glucose sensor measures your glucose in your interstitial fluid and your blood sugar measures your glucose in your blood stream. The readings are not meant to be the same.        Your sensor glucose will lag behind your blood glucose.  \"Remember the roller coaster\".  Your blood sugar is always the front car and your sensor glucose is always the last car.  When blood sugar is moving up or down, the more difference there will be.          Take it easy while wearing the sensor.  Take extra care while bathing and getting dressed.  Wear loose fitting clothes on your sensor and try to avoid laying on your sensor.  You can shower/bathe with the sensor on.  Gently pat to dry.  The Dexcom G7 is waterproof up to 8 feet.     INITIAL SET UP:  Download the Dexcom G7 alejandrina if using your smartphone and create an account.  The alejandrina will walk you through set up.  If you are using the Dexcom G7 , turn it on and follow instructions for set up.  Dexcom G7 alejandrina:    There will be a 30 minute warm up for each new sensor and each sensor should last 10 days.  It is recommended to use the provided overtape with each sensor.  You can get more overtape free from Dexcom.  The sensor readings in the first 12 hours of every new sensor are sometimes a little \"off\" as it gets to know your body fluids.  Set glucose alarms for highs and lows per your preference or at the recommendations of your diabetes educator.  You will not be able to silence or turn off the urgent low alert at 54 mg/dL.  If an alarm goes off, " "go to your alejandrina and hit \"OK\" or it will continue to alarm every 5 minutes.  Your Dexcom alejandrina or  will notify you when it is time to change your sensor.  Just remove it like a band-aid and throw it in the trash, then place a new sensor but rotate your sites.   If you need help with initial set up, contact [facility]: [phone].    DAILY ROUTINE:  Keep your phone or  within 20 feet of you to keep data communicating with your device.  If you are away from your device for more than 20 minutes, your device will alarm.  Be curious with what the sensor data shows.  How do your food choices impact your glucose?  Exercise?  Complete a fingerstick glucose check at these times:                          -when you feel differently than the sensor indicates                          -when you are not wearing a sensor                          -when you do not see a trend arrow with your sensor glucose reading    DATA SHARING:  If you want to share your sensor data with a loved one (for phone users only), send them an invitation through the Dexcom G7 alejandrina.  Go to connections, share, and follow on screen instructions.  Loved ones will use the [employer] Follow alejandrina.  Our clinic will link to your data as well (phone users only). To do this, go in your [employer] alejandrina and under the connections tab at the bottom, tap [facility] clinic and then continue.  Enter our clinic code of [email or web address] and confirm the sharing with our [place] clinic, and then click done.  Patients using the Dexcom  can upload from home via desktop or laptop computer on the Dexcom Clarity website or will have the  downloaded in clinic.  See instructions for downloading your  from home here:     [email or web address]          OTHER IMPORTANT NOTES:  If the sensor is often falling off before the 10 days are up, there are products than can help.  Talk to your diabetes educator.  You " "will need to remove your Dexcom sensor for radiology scans: MRIs, diathermy and CT Scans.  Contact [employer] if the sensor does not last the full 10 days for any reason and they will send you a replacement.  Keep your Dexcom sensor box with lot numbers as they often want this information.  Dexcom General Customer Support and education/[phone] (M-F 8am to 7pm CST)  Dexcom Product Troubleshooting and sensor replacement inquiries: [phone] () or go to [email or web address]  Turn off automatic OS updates and do not update your phone's OS until you check your diabetes device 's website to verify that the medical apps you use are compatible with the new OS version. Turn off automatic OS updates by navigating to your system settings, usually accessible through a gear icon, and find the \"software update\" option; within this section, look for a toggle switch labeled \"automatic updates\" or similar, and disable it.   After updating your OS or adding a new accessory such as wireless headphones, confirm alert settings and then carefully monitor your medical device alejandrina to make sure you can receive and hear alerts as expected.   At least once a month, check that your smartphone alerts are configured as expected. Ensure your volume, vibration, notifications, and other relevant settings still work.     Follow up:  Follow-up diabetes education appointment scheduled on 1-2 months.      Bring blood glucose meter and logbook with you to all doctor and follow-up appointments.     [place] [facility] for the [facility]:  For Your Diabetes or Nutrition Education Appointments Call:  [phone]   For Diabetes or Nutrition Related Questions:   [phone]  Send Social Bicy[employer] Message   If you need a medication refill please contact your pharmacy. Please allow 3 business days for your refills to be completed.  "

## 2025-07-01 NOTE — NURSING NOTE
Patient identified using two patient identifiers.  Ear exam showing wax occlusion completed by provider.  Warm water and H2O2 was placed in the both ear(s) via irrigation tool: elephant ear. Thank you,    JESSENIA Zeng  Medical Assistant    back/Left:

## 2025-07-01 NOTE — PROGRESS NOTES
"  Assessment & Plan       ICD-10-CM    1. Ceruminosis, bilateral  H61.23         Successful cerumen removal bilaterally. Discussed options to help prevent further impaction in the future.     Gonsalo Lyn MD        Shawnee Shields is a 64 year old, presenting for the following health issues:  Ear Problem and RECHECK        7/1/2025     4:02 PM   Additional Questions   Roomed by IA         7/1/2025     4:02 PM   Patient Reported Additional Medications   Patient reports taking the following new medications None     Ear Problem    History of Present Illness       Reason for visit:  Ear flush wax build up  Symptom onset:  1-2 weeks ago  Symptoms include:  Hearing headaches pressure  Symptom intensity:  Moderate  Symptom progression:  Staying the same  Had these symptoms before:  Yes  Has tried/received treatment for these symptoms:  Yes  Previous treatment was successful:  Yes  Prior treatment description:  Flush   He is taking medications regularly.        Has hx of ceruminosis. Experiencing increased sx over the past few weeks.  Has tried several options to remove wax at home, but has not been successful.  Noting a significant decline in overall hearing ability.               Objective    BP (!) 142/91 (BP Location: Right arm, Patient Position: Sitting, Cuff Size: Adult Large)   Pulse 93   Temp 97.8  F (36.6  C) (Temporal)   Resp 18   Ht 1.778 m (5' 10\")   Wt 109.3 kg (240 lb 14.4 oz)   SpO2 98%   BMI 34.57 kg/m    Body mass index is 34.57 kg/m .  Physical Exam   GEN: No distress  HEENT: external ears normal. Canals occluded with cerumen bilaterally.    Ears were flushed by support staff.    Following, canals are clear. TM's are normal w/o effusion.             Signed Electronically by: Gonsalo Lyn MD    "

## 2025-07-01 NOTE — LETTER
7/1/2025         RE: Cornelius Ruiz  [id number]  [place] [place] [address]        Dear Colleague,    Thank you for referring your patient, Cornelius Ruiz, to the [facility]. Please see a copy of my visit note below.    Diabetes Self-Management Education & Support    Presents for: Individual review    Type of Service: In Person Visit      Assessment  Patient states he saw his GI doctor, he does not have pancreatitis and was given the ok to continue on the Januvia.   He did receive the Dexcom G7 but has not started it and did not bring it today.    Discussed pathophysiology of type 2 DM.  Reviewed carbohydrate sources, portion control and the benefit of spreading intake throughout the day.  Encouraged 3 meals a day and small snacks between meals if hungry, adding fruits and vegetables to meals and snacks.  Illustrated the plate method and used food models to help demonstrate portion control.  Reviewed benefits of exercise to help with better blood glucose utilization.  Discussed the benefit of glucose monitoring, how the Dexcom sensor works and instructions on how to start the sensor.   Pt seems receptive and verbalized understanding of concepts discussed and recommendations provided.       Patient's most recent   Lab Results   Component Value Date    A1C 9.6 05/09/2025     is not meeting goal of <7.0        Care Plan and Education Provided:  Diabetes Pathophysiology  Healthy Eating: Balanced meals, Consistency in amount and timing of carbohydrate intake, and Plate planning method  Being Active: Amount recommended (150 minutes moderate or 75 minutes vigorous activity and 2-3 days strength training per week) and Relationship of activity to glucose  Monitoring: Blood glucose versus Continuous Glucose Monitoring, Frequency of monitoring, Individual glucose targets, and CGM instruction: Patient was instructed on Dexcom CGM system: basics of  the Dexcom sensor: insertion technique,  sensor site location and rotation, insulin administration in relation to sensor placement  Taking Medication: Action of prescribed medication(s), Side effects of prescribed medication(s), and When to take medication(s)  Problem Solving: When to call a health care provider  Reducing Risks: Goal for A1c, how it relates to glucose and how often to check  Healthy Coping: Benefits of making appropriate lifestyle changes    Patient verbalized understanding of diabetes self-management education concepts discussed, opportunities for ongoing education and support, and recommendations provided today.    Plan  Meal Plan Recommendation: eat 3 meals a day, have small snacks between meals, if needed, use portion control, use plate planning method, and refer to the meal plans and snack ideas.   Exercise / activity plan: continue with your activity, aim for at least 30 minutes 5 times a week.   Medication: continue current medications  Check blood sugars: set up your Dexcom sensor    Topics to cover at upcoming visits: Monitoring, Taking Medication, Problem Solving, Reducing Risks, and Healthy Coping    See Care Plan for co-developed, patient-state behavior change goals.    Education Materials Provided:  -  [place] Healthy Living with Diabetes Book  - My Plate Planner   - meal plans and snack ideas      Subjective/Objective  Cornelius is an 64 year old, presenting for the following diabetes education related to: Individual review  Accompanied by: Self  Diabetes education in the past 24mo: No  Focus of Visit: Patient Unsure  Diabetes type: Type 2  How confident are you filling out medical forms by yourself:: Extremely  Diabetes management related comments/concerns: a1c high pancretis  Transportation concerns: No  Difficulty affording diabetes medication?: No  Difficulty affording diabetes testing supplies?: No  Other concerns: None  Cultural Influences/Ethnic Background:  Not  or     Diabetes Symptoms &  "Complications:  Diabetes Related Symptoms: Fatigue, Polydipsia (increased thirst), Polyuria (increased urination)  Complications assessed today?: No    Patient Problem List and Family Medical History reviewed for relevant medical history, current medical status, and diabetes risk factors.    Vitals:  There were no vitals taken for this visit.  Estimated body mass index is 34.91 kg/m  as calculated from the following:    Height as of 5/19/25: 1.778 m (5' 10\").    Weight as of 5/19/25: 110.4 kg (243 lb 4.8 oz).   Last 3 BP:   BP Readings from Last 3 Encounters:   05/19/25 134/86   05/09/25 125/89   04/29/25 (!) 137/92       History   Smoking Status     Never   Smokeless Tobacco     Never       Labs:  Lab Results   Component Value Date    A1C 9.6 05/09/2025     Lab Results   Component Value Date     05/09/2025     No results found for: \"LDL\"  No results found for: \"HDL\"  GFR Estimate   Date Value Ref Range Status   05/09/2025 >90 >60 mL/min/1.73m2 Final     Comment:     eGFR calculated using 2021 CKD-EPI equation.     No results found for: \"GFRESTBLACK\"  Lab Results   Component Value Date    CR 0.65 05/09/2025     Lab Results   Component Value Date    MICROL 385.0 05/09/2025    UMALCR 139.49 (H) 05/09/2025    UCRR 276.0 05/09/2025 7/1/2025   Healthy Eating   Healthy Eating Assessed Today Yes   Cultural/Confucianist diet restrictions? No   How many times a week on average do you eat food made away from home (restaurant/take-out)? 1   Meals include Breakfast;Lunch;Dinner;Evening Snack   Breakfast 3 eggs, water, coffee with stevia( once a week)   Lunch chicken, salad with Liquid Coconut Aminos   Dinner same as lunch Or scrambled eggs with parmesan cheese   Snacks chips, veggies with dip   Beverages Water;Tea;Coffee;Diet soda;Energy drinks   Has patient met with a dietitian in the past? No         7/1/2025   Being Active   Being Active Assessed Today No   Exercise: Yes   Days per week of moderate to strenuous " exercise (like a brisk walk) 3       walk or [employer]   Barrier to exercise None         7/1/2025   Monitoring   Monitoring Assessed Today Yes   Did patient bring glucose meter to appointment?  No   Times checking blood sugar at home (number) Never     Diabetes Medication(s)       Dipeptidyl Peptidase-4 (DPP-4) Inhibitors       sitagliptin (JANUVIA) 100 MG tablet Take 1 tablet (100 mg) by mouth daily.       Insulin       insulin glargine (LANTUS VIAL) 100 UNIT/ML vial Inject 25 Units subcutaneously at bedtime.              7/1/2025   Taking Medications   Taking Medication Assessed Today Yes   Current Treatments Insulin Injections;Oral Medication (taken by mouth)   Dose schedule At bedtime   Given by Patient   Injection/Infusion sites Abdomen   Problems taking diabetes medications regularly? No   Diabetes medication side effects? Yes         7/1/2025   Problem Solving   Problem Solving Assessed Today Yes   Is the patient at risk for hypoglycemia? Yes   Hypoglycemia Frequency Never           7/1/2025   Reducing Risks   Reducing Risks Assessed Today Yes   Diabetes Risks Age over 45 years;Sedentary Lifestyle;Family History   CAD Risks Diabetes Mellitus;Sedentary lifestyle;Obesity;Male sex;Family history   Has dilated eye exam at least once a year? No   Sees dentist every 6 months? Yes   Feet checked by healthcare provider in the last year? No         7/1/2025   Healthy Coping: Diabetes Distress Assessment   Healthy Coping Assessed Today No   I feel burned out by all of the attention and effort that diabetes demands of me. 3 - A Moderate Problem   It bothers me that diabetes seems to control my life. 3 - A Moderate Problem   I am frustrated that even when I do what I am supposed to for my diabetes, it doesn't seem to make a difference. 3 - A Moderate Problem   No matter how hard I try with my diabetes, it feels like it will never be good enough. 2 - A Little Problem   I am so tired of having to worry about diabetes  all the time. 2 - A Little Problem   When it comes to my diabetes, I often feel like a failure. 2 - A Little Problem   It depresses me when I realize that my diabetes will likely never go away. 2 - A Little Problem   Living with diabetes is overwhelming for me. 2 - A Little Problem   T2 DDAS Total Score (0 - 1.9 Little or no DD, 2.0 - 2.9 Moderate DD,  3.0+ High DD) 2.4   Informal Support system: Children;[place] based;Family;Parent       Shawna GOMES, RN, PHN, [facility]   Time Spent: 60 minutes  Encounter Type: Individual    Any diabetes medication dose changes were made via the [facility] Standing Orders under the patient's referring provider.

## 2025-07-28 ENCOUNTER — OFFICE VISIT (OUTPATIENT)
Dept: DERMATOLOGY | Facility: CLINIC | Age: 65
End: 2025-07-28
Payer: COMMERCIAL

## 2025-07-28 VITALS — SYSTOLIC BLOOD PRESSURE: 144 MMHG | DIASTOLIC BLOOD PRESSURE: 88 MMHG | HEART RATE: 86 BPM

## 2025-07-28 DIAGNOSIS — Z98.890 POSTOPERATIVE SCAR: Primary | ICD-10-CM

## 2025-07-28 DIAGNOSIS — L90.5 POSTOPERATIVE SCAR: Primary | ICD-10-CM

## 2025-07-28 DIAGNOSIS — D48.5 NEOPLASM OF UNCERTAIN BEHAVIOR OF SKIN: ICD-10-CM

## 2025-07-28 DIAGNOSIS — E11.65 TYPE 2 DIABETES MELLITUS WITH HYPERGLYCEMIA, WITH LONG-TERM CURRENT USE OF INSULIN (H): Primary | ICD-10-CM

## 2025-07-28 DIAGNOSIS — Z79.4 TYPE 2 DIABETES MELLITUS WITH HYPERGLYCEMIA, WITH LONG-TERM CURRENT USE OF INSULIN (H): Primary | ICD-10-CM

## 2025-07-28 PROCEDURE — 88305 TISSUE EXAM BY PATHOLOGIST: CPT | Mod: 26 | Performed by: PATHOLOGY

## 2025-07-28 PROCEDURE — 88305 TISSUE EXAM BY PATHOLOGIST: CPT | Mod: TC | Performed by: DERMATOLOGY

## 2025-07-28 RX ORDER — TRAMADOL HYDROCHLORIDE 50 MG/1
TABLET ORAL
COMMUNITY
Start: 2025-07-14

## 2025-07-28 RX ORDER — NORTRIPTYLINE HYDROCHLORIDE 10 MG/1
10 CAPSULE ORAL
COMMUNITY
Start: 2025-07-22

## 2025-07-28 RX ORDER — MIRTAZAPINE 7.5 MG/1
TABLET, FILM COATED ORAL
COMMUNITY
Start: 2025-02-14

## 2025-07-28 NOTE — LETTER
7/28/2025       RE: Cornelius Ruiz  [address]  [facility] [address]     Dear Colleague,    Thank you for referring your patient, Cornelius Ruiz, to the [facility] DERMATOLOGIC SURGERY CLINIC [place] at [facility]. Please see a copy of my visit note below.    Subjective:   Patient presents today for scar eval and dermabrasion after MMS with Burrow's graft repair on 2/20/2025. Patient reports he is snoring more that before surgery and still feels fullness of R nasal aperture. He is interested in scar revision.     He would also like upper body skin check today.     Objective:  (+) nasal dorsum with well-healed slightly thickened pink scar   (+) depression of R soft triangle of the nose and leftward curvature of columella   (+) left anterior shoulder with pink scaly plaque   (+) right lower back with pink scaly plaque   (+) right upper chest with pink keratotic papule     Assessment and Plan:   1) Post-op scar s/p MMS with Loi's graft on 2/20/25   - Dermabrasion today as below   - Patient interested in surgical scar revision. Discussed risks/benefits of two-staged melolabial interpolation flap for scar revision. Patient wishes to schedule for January.     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 Upon follow up, it was determined that there was a contour irregularity and that dermabrasion of the scar was indicated to restore normal contour and blend the scar with the surrounding skin.      PROCEDURE:  The patient was brought to the surgical suite where informed consent was obtained. The surgical site was marked and the area was cleansed with isopropyl alcohol and 0.75 ml of 1% lidocaine with epinephrine was injected to obtain adequate anesthesia. he area was then dermabraded to provide an anatomically correct appearance. Lidocaine soaked guaze was used to obtain hemostasis. Vaseline and a sterile dressing were applied. The patient tolerated the procedure and no complications were noted. The patient was provided with verbal and written post care instructions.     The attending surgeon was present  for the entire procedure and always immediately available.     Staff Involved:  Resident/Staff     Shave biopsy with Electrodesiccation and Curettage: Location(s) left anterior shoulder, upper chest, and right lower back.  After discussion of benefits and risks including but not limited to bleeding/bruising, pain/swelling, infection, scar, incomplete removal, nerve damage/numbness, recurrence, and non-diagnostic biopsy,  verbal consent and photographs were obtained. Time-out was performed. The area was cleaned with isopropyl alcohol. 0.5mL of 1% lidocaine with epinephrine was injected to obtain adequate anesthesia of each lesion. Shave biopsy was performed. Hemostasis was achieved with aluminium chloride. Vaseline and a sterile dressing were applied. The patient tolerated the procedure and no complications were noted. The patient was provided with verbal and written post care instructions.     Dermatology Procedure Note: Biopsy + Electrodesiccation and Curettage    PREOPERATIVE DIAGNOSIS: red scaly plaque    POSTOPERATIVE DIAGNOSIS: same    LOCATION: left anterior shoulder    SIZE: 1 x 1 cm     After discussion of benefits and risks including but not limited to bleeding/bruising, pain/swelling, infection, scar, incomplete removal, nerve damage/numbness, recurrence, and non-diagnostic biopsy,  verbal  consent and photographs were obtained. Time-out was performed. The area was cleaned with isopropyl alcohol. 0.5mL of 1% lidocaine with epinephrine was injected to obtain adequate anesthesia of each lesion. Shave biopsy was performed.      Treatment options including electrodessiccation and curettage (ED and C), excision and topicals were reviewed.  The expected cure rates, healing times and anticipated scars of each option were discussed and the patient elects to proceed with ED and C.     The risks and benefits of the procedure were described to the patient.  These include but are not limited to bleeding, infection, scar, incomplete removal, and recurrence. Written informed consent was obtained. Time-out was performed. The above site was cleansed with and injected with 0.5 mL 1% lidocaine with epinephrine. Once anesthesia was obtained, the site was prepped with Alcohol. The lesion was curetted with in 3 directions with a 2mm margin and this was followed by electrodessication.  This process was repeated three times. The defect measured 1.4 x 1.4 cm. Vaseline and a bandage were applied to the wound. The patient tolerated the procedure well and was given post care instructions.    Dermatology Procedure Note: Biopsy + Electrodesiccation and Curettage    PREOPERATIVE DIAGNOSIS: red scaly papule    POSTOPERATIVE DIAGNOSIS: same    LOCATION: upper chest    SIZE: 0.6 x 0.6 cm     After discussion of benefits and risks including but not limited to bleeding/bruising, pain/swelling, infection, scar, incomplete removal, nerve damage/numbness, recurrence, and non-diagnostic biopsy,  verbal consent and photographs were obtained. Time-out was performed. The area was cleaned with isopropyl alcohol. 0.5mL of 1% lidocaine with epinephrine was injected to obtain adequate anesthesia of each lesion. Shave biopsy was performed.     Treatment options including electrodessiccation and curettage (ED and C), excision and topicals were  reviewed.  The expected cure rates, healing times and anticipated scars of each option were discussed and the patient elects to proceed with ED and C.     The risks and benefits of the procedure were described to the patient.  These include but are not limited to bleeding, infection, scar, incomplete removal, and recurrence. Written informed consent was obtained. Time-out was performed. The above site was cleansed with and injected with 0.5 mL 1% lidocaine with epinephrine. Once anesthesia was obtained, the site was prepped with Alcohol. The lesion was curetted with in 3 directions with a 2mm margin and this was followed by electrodessication.  This process was repeated three times. The defect measured 1 x 1 cm. Vaseline and a bandage were applied to the wound. The patient tolerated the procedure well and was given post care instructions.    Dermatology Procedure Note: Biopsy + Electrodesiccation and Curettage    PREOPERATIVE DIAGNOSIS: red scaly plaque    POSTOPERATIVE DIAGNOSIS: same    LOCATION: right lower back     SIZE: 0.8 x 0.8 cm     After discussion of benefits and risks including but not limited to bleeding/bruising, pain/swelling, infection, scar, incomplete removal, nerve damage/numbness, recurrence, and non-diagnostic biopsy,  verbal consent and photographs were obtained. Time-out was performed. The area was cleaned with isopropyl alcohol. 0.5mL of 1% lidocaine with epinephrine was injected to obtain adequate anesthesia of each lesion. Shave biopsy was performed.    Treatment options including electrodessiccation and curettage (ED and C), excision and topicals were reviewed.  The expected cure rates, healing times and anticipated scars of each option were discussed and the patient elects to proceed with ED and C.     The risks and benefits of the procedure were described to the patient.  These include but are not limited to bleeding, infection, scar, incomplete removal, and recurrence. Written informed  consent was obtained. Time-out was performed. The above site was cleansed with and injected with 0.5  mL 1% lidocaine with epinephrine. Once anesthesia was obtained, the site was prepped with Alcohol. The lesion was curetted with in 3 directions with a 2mm margin and this was followed by electrodessication.  This process was repeated three times. The defect measured 1.2 x 1.2 cm. Vaseline and a bandage were applied to the wound. The patient tolerated the procedure well and was given post care instructions.    Clinical Follow-up: 3 months for scar revision with melolabial transposition flap     Dr. Peralta staffed the patient.     Staff Involved:  Resident/Staff     Tami Farfan MD   Mohs Surgery and Dermatologic Oncology Fellow    Scribe Disclosure:   I, Piero Reid, am serving as a scribe; to document services personally performed by Jean Carlos Peralta MD -based on data collection and the provider's statements to me.           Attestation signed by Jean Carlos Peralta MD at 7/29/2025 10:05 AM:    Attending attestation:  I was present for key elements of the procedure and immediately available for all other portions of the procedure.  I have reviewed the note and edited it as necessary.    Jean Carlos Peralta M.D.  Professor  Director of Dermatologic Surgery  Department of Dermatology  [facility]    Dermatology Surgery Clinic  [facility] [place] [id number]      Again, thank you for allowing me to participate in the care of your patient.      Sincerely,    Jean Carlos Peralta MD

## 2025-07-28 NOTE — PATIENT INSTRUCTIONS
POST DERMABRASION CARE     Leave dressing on for 24 hours.   Remove dressing after 24 hours.   Wash area with warm soapy water, pat dry.   Apply vaseline daily and bandage     Wound Care:  Electrodesiccation and Curettage (ED&C)    I will experience scar, altered skin color, bleeding, swelling, pain, crusting and redness. I may experience altered sensation. Risks are excessive bleeding, infection, muscle weakness, thick (hypertrophic or keloidal) scar, and recurrence. A second procedure may be recommended to obtain the best cosmetic or functional result.    What is electrodesiccation and curettage?  Scraping off tissue (curettage)  Destroy tissue using electric current or cautery (electrodessication)    How do I perform wound care?  Keep dressing in place for 24 hours.  Remove prior to showering  Wash gently with mild soap and water.  Pat dry  Put on a thick layer of Vaseline on the wound using a cotton-swab   Cover the wound with a bandage to protect from dust and tight clothing  Perform wound care once daily until the center of the wound has a pinked over appearance  During wound care, do not allow the area to dry out or form a scab  **If a build up of crust develops, soak a cotton swab in hydrogen peroxide to remove the crust    What do I need?  **Hydrogen peroxide    Cotton-swabs   Vaseline or petroleum jelly   Band-AidsTM or dressing supplies as needed     If you have any questions, please feel free to contact us.    Phone numbers:  During business hours (M-F 8:00-5:00 p.m.)  [phone]  ---------------------------------------------------------  Evenings/Weekends/Holidays  Hospital - [phone]   TTY for hearing [phone]  *Ask  to page dermatologist on-call    Emergency Cbbb-[phone]  TTY for hearing impaired- [phone]

## 2025-07-28 NOTE — PROGRESS NOTES
Subjective:   Patient presents today for scar eval and dermabrasion after MMS with Burrow's graft repair on 2/20/2025. Patient reports he is snoring more that before surgery and still feels fullness of R nasal aperture. He is interested in scar revision.     He would also like upper body skin check today.     Objective:  (+) nasal dorsum with well-healed slightly thickened pink scar   (+) depression of R soft triangle of the nose and leftward curvature of columella   (+) left anterior shoulder with pink scaly plaque   (+) right lower back with pink scaly plaque   (+) right upper chest with pink keratotic papule     Assessment and Plan:   1) Post-op scar s/p MMS with Loi's graft on 2/20/25   - Dermabrasion today as below   - Patient interested in surgical scar revision. Discussed risks/benefits of two-staged melolabial interpolation flap for scar revision. Patient wishes to schedule for January.     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 Upon follow up, it was determined that there was a contour irregularity and that dermabrasion of the scar was indicated to restore normal contour and blend the scar with the surrounding skin.     PROCEDURE:  The patient was brought to the surgical suite where informed consent was obtained. The surgical site was marked and the area was cleansed with isopropyl alcohol and 0.75 ml of 1% lidocaine with epinephrine was injected to obtain adequate anesthesia. he area was then dermabraded to provide an anatomically correct  appearance. Lidocaine soaked guaze was used to obtain hemostasis. Vaseline and a sterile dressing were applied. The patient tolerated the procedure and no complications were noted. The patient was provided with verbal and written post care instructions.     The attending surgeon was present  for the entire procedure and always immediately available.     Staff Involved:  Resident/Staff     Shave biopsy with Electrodesiccation and Curettage: Location(s) left anterior shoulder, upper chest, and right lower back.  After discussion of benefits and risks including but not limited to bleeding/bruising, pain/swelling, infection, scar, incomplete removal, nerve damage/numbness, recurrence, and non-diagnostic biopsy,  verbal consent and photographs were obtained. Time-out was performed. The area was cleaned with isopropyl alcohol. 0.5mL of 1% lidocaine with epinephrine was injected to obtain adequate anesthesia of each lesion. Shave biopsy was performed. Hemostasis was achieved with aluminium chloride. Vaseline and a sterile dressing were applied. The patient tolerated the procedure and no complications were noted. The patient was provided with verbal and written post care instructions.     Dermatology Procedure Note: Biopsy + Electrodesiccation and Curettage    PREOPERATIVE DIAGNOSIS: red scaly plaque    POSTOPERATIVE DIAGNOSIS: same    LOCATION: left anterior shoulder    SIZE: 1 x 1 cm     After discussion of benefits and risks including but not limited to bleeding/bruising, pain/swelling, infection, scar, incomplete removal, nerve damage/numbness, recurrence, and non-diagnostic biopsy,  verbal consent and photographs were obtained. Time-out was performed. The area was cleaned with isopropyl alcohol. 0.5mL of 1% lidocaine with epinephrine was injected to obtain adequate anesthesia of each lesion. Shave biopsy was performed.      Treatment options including electrodessiccation and curettage (ED and C), excision and topicals  were reviewed.  The expected cure rates, healing times and anticipated scars of each option were discussed and the patient elects to proceed with ED and C.     The risks and benefits of the procedure were described to the patient.  These include but are not limited to bleeding, infection, scar, incomplete removal, and recurrence. Written informed consent was obtained. Time-out was performed. The above site was cleansed with and injected with 0.5 mL 1% lidocaine with epinephrine. Once anesthesia was obtained, the site was prepped with Alcohol. The lesion was curetted with in 3 directions with a 2mm margin and this was followed by electrodessication.  This process was repeated three times. The defect measured 1.4 x 1.4 cm. Vaseline and a bandage were applied to the wound. The patient tolerated the procedure well and was given post care instructions.    Dermatology Procedure Note: Biopsy + Electrodesiccation and Curettage    PREOPERATIVE DIAGNOSIS: red scaly papule    POSTOPERATIVE DIAGNOSIS: same    LOCATION: upper chest    SIZE: 0.6 x 0.6 cm     After discussion of benefits and risks including but not limited to bleeding/bruising, pain/swelling, infection, scar, incomplete removal, nerve damage/numbness, recurrence, and non-diagnostic biopsy,  verbal consent and photographs were obtained. Time-out was performed. The area was cleaned with isopropyl alcohol. 0.5mL of 1% lidocaine with epinephrine was injected to obtain adequate anesthesia of each lesion. Shave biopsy was performed.     Treatment options including electrodessiccation and curettage (ED and C), excision and topicals were reviewed.  The expected cure rates, healing times and anticipated scars of each option were discussed and the patient elects to proceed with ED and C.     The risks and benefits of the procedure were described to the patient.  These include but are not limited to bleeding, infection, scar, incomplete removal, and recurrence. Written  informed consent was obtained. Time-out was performed. The above site was cleansed with and injected with 0.5 mL 1% lidocaine with epinephrine. Once anesthesia was obtained, the site was prepped with Alcohol. The lesion was curetted with in 3 directions with a 2mm margin and this was followed by electrodessication.  This process was repeated three times. The defect measured 1 x 1 cm. Vaseline and a bandage were applied to the wound. The patient tolerated the procedure well and was given post care instructions.    Dermatology Procedure Note: Biopsy + Electrodesiccation and Curettage    PREOPERATIVE DIAGNOSIS: red scaly plaque    POSTOPERATIVE DIAGNOSIS: same    LOCATION: right lower back     SIZE: 0.8 x 0.8 cm     After discussion of benefits and risks including but not limited to bleeding/bruising, pain/swelling, infection, scar, incomplete removal, nerve damage/numbness, recurrence, and non-diagnostic biopsy,  verbal consent and photographs were obtained. Time-out was performed. The area was cleaned with isopropyl alcohol. 0.5mL of 1% lidocaine with epinephrine was injected to obtain adequate anesthesia of each lesion. Shave biopsy was performed.    Treatment options including electrodessiccation and curettage (ED and C), excision and topicals were reviewed.  The expected cure rates, healing times and anticipated scars of each option were discussed and the patient elects to proceed with ED and C.     The risks and benefits of the procedure were described to the patient.  These include but are not limited to bleeding, infection, scar, incomplete removal, and recurrence. Written informed consent was obtained. Time-out was performed. The above site was cleansed with and injected with 0.5  mL 1% lidocaine with epinephrine. Once anesthesia was obtained, the site was prepped with Alcohol. The lesion was curetted with in 3 directions with a 2mm margin and this was followed by electrodessication.  This process was repeated  three times. The defect measured 1.2 x 1.2 cm. Vaseline and a bandage were applied to the wound. The patient tolerated the procedure well and was given post care instructions.    Clinical Follow-up: 3 months for scar revision with melolabial transposition flap     Dr. Peralta staffed the patient.     Staff Involved:  Resident/Staff     Tami Farfan MD   Mohs Surgery and Dermatologic Oncology Fellow    Scribe Disclosure:   IPiero, am serving as a scribe; to document services personally performed by Jean Carlos Peralta MD -based on data collection and the provider's statements to me.

## 2025-07-28 NOTE — NURSING NOTE
Chief Complaint   Patient presents with    Derm Problem     Dermabrasion nose     TAMARA Soria-BSN  Dermatology Surgery

## 2025-07-28 NOTE — TELEPHONE ENCOUNTER
Clinic RN: Please investigate patient's chart or contact patient if the information cannot be found because the medication is listed as historical or discontinued. Confirm patient is taking this medication. Document findings and route refill encounter to provider for approval or denial.  Thanks,  Yudy KEARNEY RN

## 2025-07-29 RX ORDER — INSULIN GLARGINE 100 [IU]/ML
INJECTION, SOLUTION SUBCUTANEOUS
Qty: 60 ML | Refills: 1 | Status: SHIPPED | OUTPATIENT
Start: 2025-07-29

## 2025-07-29 NOTE — TELEPHONE ENCOUNTER
Received call back from patient. Patient confirms he is currently taking 25 units daily. Previously was prescribed by Dr May but patient is requesting Dr Lyn take over prescribing.     Please review and sign if appropriate.    Sylvia Dumont RN   [facility]

## 2025-07-29 NOTE — TELEPHONE ENCOUNTER
See refill RN note below. Lantus is listed on med list as historical, and our providers have never prescribed it. Called patient to receive more information. Patient not available, left message to call us back and speak with a nurse.     When patient calls back:   -Confirm he is taking the medication listed below as we have it listed  -Inquire who has prescribed this for him in the past  -Is he wanting our providers to take over ordering this medication?     insulin glargine (LANTUS VIAL) 100 UNIT/ML vial -- -- 5/9/2025 -- No   Sig - Route: Inject 25 Units subcutaneously at bedtime. - Subcutaneous       Joann Elizabeth RN on 7/29/2025 at 9:13 AM

## 2025-08-04 ENCOUNTER — TELEPHONE (OUTPATIENT)
Dept: PEDIATRICS | Facility: CLINIC | Age: 65
End: 2025-08-04
Payer: COMMERCIAL

## 2025-08-04 LAB
PATH REPORT.COMMENTS IMP SPEC: NORMAL
PATH REPORT.FINAL DX SPEC: NORMAL
PATH REPORT.GROSS SPEC: NORMAL
PATH REPORT.MICROSCOPIC SPEC OTHER STN: NORMAL
PATH REPORT.RELEVANT HX SPEC: NORMAL
